# Patient Record
Sex: MALE | Race: ASIAN | NOT HISPANIC OR LATINO | URBAN - METROPOLITAN AREA
[De-identification: names, ages, dates, MRNs, and addresses within clinical notes are randomized per-mention and may not be internally consistent; named-entity substitution may affect disease eponyms.]

---

## 2017-06-05 ENCOUNTER — OUTPATIENT (OUTPATIENT)
Dept: EMERGENCY DEPT | Facility: HOSPITAL | Age: 47
LOS: 1 days | Discharge: HOME | End: 2017-06-05
Payer: MEDICAID

## 2017-06-05 VITALS
OXYGEN SATURATION: 99 % | SYSTOLIC BLOOD PRESSURE: 127 MMHG | RESPIRATION RATE: 18 BRPM | DIASTOLIC BLOOD PRESSURE: 94 MMHG | HEART RATE: 85 BPM | TEMPERATURE: 98 F

## 2017-06-05 VITALS
RESPIRATION RATE: 16 BRPM | HEART RATE: 61 BPM | SYSTOLIC BLOOD PRESSURE: 127 MMHG | DIASTOLIC BLOOD PRESSURE: 88 MMHG | OXYGEN SATURATION: 98 %

## 2017-06-05 DIAGNOSIS — I20.0 UNSTABLE ANGINA: ICD-10-CM

## 2017-06-05 DIAGNOSIS — Z29.9 ENCOUNTER FOR PROPHYLACTIC MEASURES, UNSPECIFIED: ICD-10-CM

## 2017-06-05 DIAGNOSIS — K21.9 GASTRO-ESOPHAGEAL REFLUX DISEASE WITHOUT ESOPHAGITIS: ICD-10-CM

## 2017-06-05 DIAGNOSIS — I10 ESSENTIAL (PRIMARY) HYPERTENSION: ICD-10-CM

## 2017-06-05 DIAGNOSIS — R07.9 CHEST PAIN, UNSPECIFIED: ICD-10-CM

## 2017-06-05 DIAGNOSIS — R74.0 NONSPECIFIC ELEVATION OF LEVELS OF TRANSAMINASE AND LACTIC ACID DEHYDROGENASE [LDH]: ICD-10-CM

## 2017-06-05 DIAGNOSIS — Z95.5 PRESENCE OF CORONARY ANGIOPLASTY IMPLANT AND GRAFT: Chronic | ICD-10-CM

## 2017-06-05 DIAGNOSIS — G89.29 OTHER CHRONIC PAIN: ICD-10-CM

## 2017-06-05 LAB
ALBUMIN SERPL ELPH-MCNC: 4.1 G/DL — SIGNIFICANT CHANGE UP (ref 3.3–5)
ALP SERPL-CCNC: 59 U/L — SIGNIFICANT CHANGE UP (ref 40–120)
ALT FLD-CCNC: 97 U/L — HIGH (ref 4–41)
AST SERPL-CCNC: 56 U/L — HIGH (ref 4–40)
BASOPHILS # BLD AUTO: 0.02 K/UL — SIGNIFICANT CHANGE UP (ref 0–0.2)
BASOPHILS NFR BLD AUTO: 0.3 % — SIGNIFICANT CHANGE UP (ref 0–2)
BILIRUB SERPL-MCNC: 0.7 MG/DL — SIGNIFICANT CHANGE UP (ref 0.2–1.2)
BUN SERPL-MCNC: 12 MG/DL — SIGNIFICANT CHANGE UP (ref 7–23)
CALCIUM SERPL-MCNC: 9.5 MG/DL — SIGNIFICANT CHANGE UP (ref 8.4–10.5)
CHLORIDE SERPL-SCNC: 102 MMOL/L — SIGNIFICANT CHANGE UP (ref 98–107)
CK MB BLD-MCNC: 1.64 NG/ML — SIGNIFICANT CHANGE UP (ref 1–6.6)
CK MB BLD-MCNC: 1.69 NG/ML — SIGNIFICANT CHANGE UP (ref 1–6.6)
CK MB BLD-MCNC: SIGNIFICANT CHANGE UP (ref 0–2.5)
CK MB BLD-MCNC: SIGNIFICANT CHANGE UP (ref 0–2.5)
CK SERPL-CCNC: 120 U/L — SIGNIFICANT CHANGE UP (ref 30–200)
CK SERPL-CCNC: 131 U/L — SIGNIFICANT CHANGE UP (ref 30–200)
CO2 SERPL-SCNC: 21 MMOL/L — LOW (ref 22–31)
CREAT SERPL-MCNC: 1.03 MG/DL — SIGNIFICANT CHANGE UP (ref 0.5–1.3)
EOSINOPHIL # BLD AUTO: 0.07 K/UL — SIGNIFICANT CHANGE UP (ref 0–0.5)
EOSINOPHIL NFR BLD AUTO: 1.1 % — SIGNIFICANT CHANGE UP (ref 0–6)
GLUCOSE SERPL-MCNC: 112 MG/DL — HIGH (ref 70–99)
HCT VFR BLD CALC: 44.8 % — SIGNIFICANT CHANGE UP (ref 39–50)
HGB BLD-MCNC: 14.8 G/DL — SIGNIFICANT CHANGE UP (ref 13–17)
IMM GRANULOCYTES NFR BLD AUTO: 0.3 % — SIGNIFICANT CHANGE UP (ref 0–1.5)
LYMPHOCYTES # BLD AUTO: 1.83 K/UL — SIGNIFICANT CHANGE UP (ref 1–3.3)
LYMPHOCYTES # BLD AUTO: 28.6 % — SIGNIFICANT CHANGE UP (ref 13–44)
MAGNESIUM SERPL-MCNC: 2.1 MG/DL — SIGNIFICANT CHANGE UP (ref 1.6–2.6)
MCHC RBC-ENTMCNC: 27.8 PG — SIGNIFICANT CHANGE UP (ref 27–34)
MCHC RBC-ENTMCNC: 33 % — SIGNIFICANT CHANGE UP (ref 32–36)
MCV RBC AUTO: 84.1 FL — SIGNIFICANT CHANGE UP (ref 80–100)
MONOCYTES # BLD AUTO: 0.44 K/UL — SIGNIFICANT CHANGE UP (ref 0–0.9)
MONOCYTES NFR BLD AUTO: 6.9 % — SIGNIFICANT CHANGE UP (ref 2–14)
NEUTROPHILS # BLD AUTO: 4.01 K/UL — SIGNIFICANT CHANGE UP (ref 1.8–7.4)
NEUTROPHILS NFR BLD AUTO: 62.8 % — SIGNIFICANT CHANGE UP (ref 43–77)
PLATELET # BLD AUTO: 194 K/UL — SIGNIFICANT CHANGE UP (ref 150–400)
PMV BLD: 12.6 FL — SIGNIFICANT CHANGE UP (ref 7–13)
POTASSIUM SERPL-MCNC: 4.4 MMOL/L — SIGNIFICANT CHANGE UP (ref 3.5–5.3)
POTASSIUM SERPL-SCNC: 4.4 MMOL/L — SIGNIFICANT CHANGE UP (ref 3.5–5.3)
PROT SERPL-MCNC: 6.7 G/DL — SIGNIFICANT CHANGE UP (ref 6–8.3)
RBC # BLD: 5.33 M/UL — SIGNIFICANT CHANGE UP (ref 4.2–5.8)
RBC # FLD: 13.3 % — SIGNIFICANT CHANGE UP (ref 10.3–14.5)
SODIUM SERPL-SCNC: 139 MMOL/L — SIGNIFICANT CHANGE UP (ref 135–145)
TROPONIN T SERPL-MCNC: < 0.06 NG/ML — SIGNIFICANT CHANGE UP (ref 0–0.06)
TROPONIN T SERPL-MCNC: < 0.06 NG/ML — SIGNIFICANT CHANGE UP (ref 0–0.06)
WBC # BLD: 6.39 K/UL — SIGNIFICANT CHANGE UP (ref 3.8–10.5)
WBC # FLD AUTO: 6.39 K/UL — SIGNIFICANT CHANGE UP (ref 3.8–10.5)

## 2017-06-05 PROCEDURE — 93010 ELECTROCARDIOGRAM REPORT: CPT | Mod: 59

## 2017-06-05 RX ORDER — MORPHINE SULFATE 50 MG/1
4 CAPSULE, EXTENDED RELEASE ORAL ONCE
Qty: 0 | Refills: 0 | Status: DISCONTINUED | OUTPATIENT
Start: 2017-06-05 | End: 2017-06-05

## 2017-06-05 RX ORDER — NITROGLYCERIN 6.5 MG
0.4 CAPSULE, EXTENDED RELEASE ORAL ONCE
Qty: 0 | Refills: 0 | Status: COMPLETED | OUTPATIENT
Start: 2017-06-05 | End: 2017-06-05

## 2017-06-05 RX ORDER — HEPARIN SODIUM 5000 [USP'U]/ML
4400 INJECTION INTRAVENOUS; SUBCUTANEOUS EVERY 6 HOURS
Qty: 0 | Refills: 0 | Status: DISCONTINUED | OUTPATIENT
Start: 2017-06-05 | End: 2017-06-05

## 2017-06-05 RX ORDER — PANTOPRAZOLE SODIUM 20 MG/1
40 TABLET, DELAYED RELEASE ORAL
Qty: 0 | Refills: 0 | Status: DISCONTINUED | OUTPATIENT
Start: 2017-06-05 | End: 2017-06-05

## 2017-06-05 RX ORDER — HEPARIN SODIUM 5000 [USP'U]/ML
INJECTION INTRAVENOUS; SUBCUTANEOUS
Qty: 25000 | Refills: 0 | Status: DISCONTINUED | OUTPATIENT
Start: 2017-06-05 | End: 2017-06-05

## 2017-06-05 RX ORDER — METOPROLOL TARTRATE 50 MG
50 TABLET ORAL DAILY
Qty: 0 | Refills: 0 | Status: DISCONTINUED | OUTPATIENT
Start: 2017-06-05 | End: 2017-06-05

## 2017-06-05 RX ORDER — ATORVASTATIN CALCIUM 80 MG/1
40 TABLET, FILM COATED ORAL AT BEDTIME
Qty: 0 | Refills: 0 | Status: DISCONTINUED | OUTPATIENT
Start: 2017-06-05 | End: 2017-06-05

## 2017-06-05 RX ORDER — OXYCODONE HYDROCHLORIDE 5 MG/1
10 TABLET ORAL EVERY 6 HOURS
Qty: 0 | Refills: 0 | Status: DISCONTINUED | OUTPATIENT
Start: 2017-06-05 | End: 2017-06-05

## 2017-06-05 RX ORDER — OXYCODONE HYDROCHLORIDE 5 MG/1
5 TABLET ORAL EVERY 6 HOURS
Qty: 0 | Refills: 0 | Status: DISCONTINUED | OUTPATIENT
Start: 2017-06-05 | End: 2017-06-05

## 2017-06-05 RX ORDER — SODIUM CHLORIDE 9 MG/ML
500 INJECTION INTRAMUSCULAR; INTRAVENOUS; SUBCUTANEOUS
Qty: 0 | Refills: 0 | Status: DISCONTINUED | OUTPATIENT
Start: 2017-06-05 | End: 2017-06-05

## 2017-06-05 RX ORDER — ASPIRIN/CALCIUM CARB/MAGNESIUM 324 MG
81 TABLET ORAL DAILY
Qty: 0 | Refills: 0 | Status: DISCONTINUED | OUTPATIENT
Start: 2017-06-05 | End: 2017-06-05

## 2017-06-05 RX ORDER — CLOPIDOGREL BISULFATE 75 MG/1
600 TABLET, FILM COATED ORAL ONCE
Qty: 0 | Refills: 0 | Status: COMPLETED | OUTPATIENT
Start: 2017-06-05 | End: 2017-06-05

## 2017-06-05 RX ORDER — GABAPENTIN 400 MG/1
600 CAPSULE ORAL THREE TIMES A DAY
Qty: 0 | Refills: 0 | Status: DISCONTINUED | OUTPATIENT
Start: 2017-06-05 | End: 2017-06-05

## 2017-06-05 RX ORDER — METOPROLOL TARTRATE 50 MG
25 TABLET ORAL DAILY
Qty: 0 | Refills: 0 | Status: DISCONTINUED | OUTPATIENT
Start: 2017-06-05 | End: 2017-06-05

## 2017-06-05 RX ORDER — ASPIRIN/CALCIUM CARB/MAGNESIUM 324 MG
162 TABLET ORAL ONCE
Qty: 0 | Refills: 0 | Status: COMPLETED | OUTPATIENT
Start: 2017-06-05 | End: 2017-06-05

## 2017-06-05 RX ORDER — ATORVASTATIN CALCIUM 80 MG/1
1 TABLET, FILM COATED ORAL
Qty: 30 | Refills: 0 | OUTPATIENT
Start: 2017-06-05 | End: 2017-07-05

## 2017-06-05 RX ORDER — ACETAMINOPHEN 500 MG
650 TABLET ORAL ONCE
Qty: 0 | Refills: 0 | Status: COMPLETED | OUTPATIENT
Start: 2017-06-05 | End: 2017-06-05

## 2017-06-05 RX ADMIN — MORPHINE SULFATE 4 MILLIGRAM(S): 50 CAPSULE, EXTENDED RELEASE ORAL at 13:40

## 2017-06-05 RX ADMIN — GABAPENTIN 600 MILLIGRAM(S): 400 CAPSULE ORAL at 15:38

## 2017-06-05 RX ADMIN — SODIUM CHLORIDE 75 MILLILITER(S): 9 INJECTION INTRAMUSCULAR; INTRAVENOUS; SUBCUTANEOUS at 17:56

## 2017-06-05 RX ADMIN — HEPARIN SODIUM 900 UNIT(S)/HR: 5000 INJECTION INTRAVENOUS; SUBCUTANEOUS at 15:38

## 2017-06-05 RX ADMIN — Medication 162 MILLIGRAM(S): at 09:38

## 2017-06-05 RX ADMIN — Medication 650 MILLIGRAM(S): at 19:18

## 2017-06-05 RX ADMIN — MORPHINE SULFATE 4 MILLIGRAM(S): 50 CAPSULE, EXTENDED RELEASE ORAL at 13:07

## 2017-06-05 RX ADMIN — Medication 0.4 MILLIGRAM(S): at 13:58

## 2017-06-05 RX ADMIN — CLOPIDOGREL BISULFATE 600 MILLIGRAM(S): 75 TABLET, FILM COATED ORAL at 15:38

## 2017-06-05 NOTE — DISCHARGE NOTE ADULT - CARE PROVIDER_API CALL
Chelsie Boudreaux (JOSLYN), Internal Medicine  [id number] [place] [address]  Phone: [phone]  Fax: [phone]

## 2017-06-05 NOTE — H&P ADULT - PROBLEM SELECTOR PLAN 1
tele monitor   cardiac enzymes x 3  Serial EKGs  DASH diet  continue ASA  House Cards following  increased metoprolol succinate to 50mg po daily. Order Nitro SL. Consider Imdur.  NST pharm if Hloli x3 neg

## 2017-06-05 NOTE — PATIENT PROFILE ADULT. - LANGUAGE ASSISTANCE NEEDED
Patient refusing phone at this time- speaks English/No-Patient/Caregiver offered and refused free interpretation services.

## 2017-06-05 NOTE — H&P ADULT - HISTORY OF PRESENT ILLNESS
45 yo male PMHx of CAD with cardiac stents x3 three years ago, HTN, GERD, and chronic Left shoulder and back pain p/w constant left sided chest pain at rest since this morning. Chest pain 10/10, described as pressure, non-pleuritic, non-radiating, accompanied by nausea and dizziness. Pt admits to fatigue and KRAMER x 3 months. Pt denies fever, chills, diaphoresis, palpitations, vomiting, or abdominal pain.     In E.D. pt received 2 baby aspirins and IV Morphine 4mg x1 with partial relief of chest pain.     On admission, chest pain 5/10, ordered nitroglycerin SL.

## 2017-06-05 NOTE — DISCHARGE NOTE ADULT - ADDITIONAL INSTRUCTIONS
1. Follow up with cardiologist within one week of discharge. Call for appointment. Return to ED for any concerning symptoms. Continue medications as prescribed. Low salt, low fat, low cholesterol diet.  2. No heavy lifting greater than 5-10 pounds with right wrist x one week. No strenuous activity x 3 weeks. Monitor site of procedure and notify your doctor for any redness, swelling, discharge.

## 2017-06-05 NOTE — DISCHARGE NOTE ADULT - HOSPITAL COURSE
47 y/o male with a PMHx of (reportedly) CAD S/P stent placement x 3, HTN, GERD, and chronic shoulder and back pain presents to ED with constant left sided chest pain. Upon arrival to ED, EKG revealed NSR with no ischemic changes. Pt ruled out with two sets of negative cardiac enzymes. CXR was clear. AST/ALT was slightly elevated. Pt was admitted to telemetry. Pt underwent cardiac cath on 6/5 which revealed normal coronary arteries with normal ejection fraction and no evidence of prior stenting. Pt had no events on telemetry. Case discussed with cards fellow on 6/5. Pt now medically stable for discharge home. No need for further workup. AST/ALT can be monitored as outpatient. No need for US abdomen. Pt now stable for discharge.

## 2017-06-05 NOTE — DISCHARGE NOTE ADULT - CARE PLAN
Principal Discharge DX:	Chest pain  Goal:	Prevent future episodes of chest pain. Ensure compliance with medications. Your cardiac cath revealed normal coronary arteries with no evidence of past or present blockages.  Instructions for follow-up, activity and diet:	Follow up with cardiologist within one week of discharge. Call for appointment. Return to ED for any concerning symptoms. Continue medications as prescribed. Low salt, low fat, low cholesterol diet.  Secondary Diagnosis:	HTN (hypertension)  Goal:	Maintain adequate control of your blood pressure. Goal BP < 130/80. Continue low sodium diet.  Instructions for follow-up, activity and diet:	Follow up with PCP and/or cardiologist for ongoing medical management of your hypertension. Continue medications as prescribed. Low salt diet.  Secondary Diagnosis:	GERD (gastroesophageal reflux disease)  Goal:	Continue your medications. Avoid fatty, greasy, spicy foods.  Instructions for follow-up, activity and diet:	Follow up with your PCP for slightly elevated liver enzymes.

## 2017-06-05 NOTE — DISCHARGE NOTE ADULT - PLAN OF CARE
Prevent future episodes of chest pain. Ensure compliance with medications. Your cardiac cath revealed normal coronary arteries with no evidence of past or present blockages. Follow up with cardiologist within one week of discharge. Call for appointment. Return to ED for any concerning symptoms. Continue medications as prescribed. Low salt, low fat, low cholesterol diet. Maintain adequate control of your blood pressure. Goal BP < 130/80. Continue low sodium diet. Follow up with PCP and/or cardiologist for ongoing medical management of your hypertension. Continue medications as prescribed. Low salt diet. Continue your medications. Avoid fatty, greasy, spicy foods. Follow up with your PCP for slightly elevated liver enzymes.

## 2017-06-05 NOTE — ED SUB INTERN NOTE - OBJECTIVE STATEMENT FT
Radha PA-S = 47yo Lithuanian speaking male, PMHx htn, 3 stents placed 3y ago at [facility], presents c/o upper L sided chest "pressure", sob and chills since yesterday. Pt states that the pain began yesterday morning but led to sob which prevented him from running/walking/working, which led him to the ED for assessment. Pt states that he took 1 ASA (unk dose) this AM and 2 just before arriving at the ED with no relief of chest pain. Pt rates the chest pain a 10/10 and describes the pain as a "pressure." There is no radiation of the "pressure." Pt admits to having sob sxs for the past 3 years, but yesterday was the worst he has ever felt it. PMHx of htn is controlled by metoprolol and ASA as per pt. Pt does not recall his last stress test and does not follow a cardiologist. Pt denies palpitations, diaphoresis, tobacco/EtOH use, other surgical hx, sig family history.    Lungs - CTAB, no w/r/r  Cardio - clear S1 S2, RRR, no m/g/r appreciated at this time

## 2017-06-05 NOTE — CHART NOTE - NSCHARTNOTEFT_GEN_A_CORE
Called by RN to see patient for chest pain. CP 4/10 left sided   NO SOB, KRAMER, PND, orthopnea, palpitations, diaphoresis, lightheadedness, dizziness, syncope, increased lowr extremity edema, fever chills, malaise, myalgias, anorexia, weight changes ( loss or gain), night sweats, generalized fatigue abdominal pain, N/V/C/D BRBPR, melena, urinary symptoms, cough, and wheezing.      Physical Exam  Vital Signs Last 24 Hrs  T(C): 36.9, Max: 36.9 (06-05 @ 14:22)  T(F): 98.4, Max: 98.4 (06-05 @ 14:22)  HR: 72 (64 - 85)  BP: 118/83 (118/83 - 133/83)  BP(mean): --  RR: 16 (16 - 18)  SpO2: 100% (98% - 100%)  Appearance: Normal	  HEENT:   Normal oral mucosa, PERRL, EOMI	  Lymphatic: No lymphadenopathy  Cardiovascular: Normal S1 S2, No JVD, No murmurs, No edema  Respiratory: Lungs clear to auscultation	  Psychiatry: A & O x 3, Mood & affect appropriate  Gastrointestinal:  Soft, Non-tender, + BS	  Skin: No rashes, No ecchymoses, No cyanosis  Neurologic: Non-focal  Extremities: Normal range of motion, No clubbing, cyanosis or edema  Vascular: Peripheral pulses palpable 2+ bilaterally    TELEMETRY: Rate regular @ 77 bpm no arrythmias 	    ECG: SR @ 54 bpm 	  RADIOLOGY: CXR:     OTHER: 	  LABS                        14.8 [13.0 - 17.0]  6.39 [3.8 - 10.5] )-----------( 194 [150 - 400]    ( 05 Jun 2017 09:22 )             44.8 [39.0 - 50.0]    06-05    139  |  102  |  12  ----------------------------<  112<H>  4.4   |  21<L>  |  1.03    Ca    9.5      05 Jun 2017 09:22  Mg     2.1     06-05    TPro  6.7  /  Alb  4.1  /  TBili  0.7  /  DBili  x   /  AST  56<H>  /  ALT  97<H>  /  AlkPhos  59  06-05    CARDIAC MARKERS ( 05 Jun 2017 09:22 )  x     / < 0.06 ng/mL / 131 u/L / 1.69 ng/mL / x        LIVER FUNCTIONS - ( 05 Jun 2017 09:22 )  Alb: 4.1 g/dL / Pro: 6.7 g/dL / ALK PHOS: 59 u/L / ALT: 97 u/L / AST: 56 u/L / GGT: x           CAPILLARY BLOOD GLUCOSE    I&O's Detail    Daily Height in cm: 172.72 (05 Jun 2017 13:58)    Daily   Allergies    No Known Allergies    Intolerances      MEDICATIONS  (STANDING):  aspirin enteric coated 81milliGRAM(s) Oral daily  gabapentin 600milliGRAM(s) Oral three times a day  pantoprazole    Tablet 40milliGRAM(s) Oral before breakfast  metoprolol succinate ER 50milliGRAM(s) Oral daily  atorvastatin 40milliGRAM(s) Oral at bedtime    MEDICATIONS  (PRN):  oxyCODONE IR 5milliGRAM(s) Oral every 6 hours PRN mild and Moderate Pain (4 - 6)  oxyCODONE IR 10milliGRAM(s) Oral every 6 hours PRN Severe Pain (7 - 10)      47 yo M unstable angina with chest pain  1) CAD- Continue aspirin, metoprolol, statin. O2, morphine. NTG PRN for chest pain. Consider heparin/cath today  Will discuss with Dr Shi Called by RN to see patient for chest pain. CP 4/10 left sided   NO SOB, KRAMER, PND, orthopnea, palpitations, diaphoresis, lightheadedness, dizziness, syncope, increased lowr extremity edema, fever chills, malaise, myalgias, anorexia, weight changes ( loss or gain), night sweats, generalized fatigue abdominal pain, N/V/C/D BRBPR, melena, urinary symptoms, cough, and wheezing.      Physical Exam  Vital Signs Last 24 Hrs  T(C): 36.9, Max: 36.9 (06-05 @ 14:22)  T(F): 98.4, Max: 98.4 (06-05 @ 14:22)  HR: 72 (64 - 85)  BP: 118/83 (118/83 - 133/83)  BP(mean): --  RR: 16 (16 - 18)  SpO2: 100% (98% - 100%)  Appearance: Normal	  HEENT:   Normal oral mucosa, PERRL, EOMI	  Lymphatic: No lymphadenopathy  Cardiovascular: Normal S1 S2, No JVD, No murmurs, No edema  Respiratory: Lungs clear to auscultation	  Psychiatry: A & O x 3, Mood & affect appropriate  Gastrointestinal:  Soft, Non-tender, + BS	  Skin: No rashes, No ecchymoses, No cyanosis  Neurologic: Non-focal  Extremities: Normal range of motion, No clubbing, cyanosis or edema  Vascular: Peripheral pulses palpable 2+ bilaterally    TELEMETRY: Rate regular @ 77 bpm no arrythmias 	    ECG: SR @ 54 bpm 	  RADIOLOGY: CXR:     OTHER: 	  LABS                        14.8 [13.0 - 17.0]  6.39 [3.8 - 10.5] )-----------( 194 [150 - 400]    ( 05 Jun 2017 09:22 )             44.8 [39.0 - 50.0]    06-05    139  |  102  |  12  ----------------------------<  112<H>  4.4   |  21<L>  |  1.03    Ca    9.5      05 Jun 2017 09:22  Mg     2.1     06-05    TPro  6.7  /  Alb  4.1  /  TBili  0.7  /  DBili  x   /  AST  56<H>  /  ALT  97<H>  /  AlkPhos  59  06-05    CARDIAC MARKERS ( 05 Jun 2017 09:22 )  x     / < 0.06 ng/mL / 131 u/L / 1.69 ng/mL / x        LIVER FUNCTIONS - ( 05 Jun 2017 09:22 )  Alb: 4.1 g/dL / Pro: 6.7 g/dL / ALK PHOS: 59 u/L / ALT: 97 u/L / AST: 56 u/L / GGT: x           CAPILLARY BLOOD GLUCOSE    I&O's Detail    Daily Height in cm: 172.72 (05 Jun 2017 13:58)    Daily   Allergies    No Known Allergies    Intolerances      MEDICATIONS  (STANDING):  aspirin enteric coated 81milliGRAM(s) Oral daily  gabapentin 600milliGRAM(s) Oral three times a day  pantoprazole    Tablet 40milliGRAM(s) Oral before breakfast  metoprolol succinate ER 50milliGRAM(s) Oral daily  atorvastatin 40milliGRAM(s) Oral at bedtime    MEDICATIONS  (PRN):  oxyCODONE IR 5milliGRAM(s) Oral every 6 hours PRN mild and Moderate Pain (4 - 6)  oxyCODONE IR 10milliGRAM(s) Oral every 6 hours PRN Severe Pain (7 - 10)      45 yo M unstable angina with chest pain  1) CAD- Continue aspirin, metoprolol, statin. O2, morphine. NTG PRN for chest pain. Consider heparin/cath today  Discuss with Dr Shi and Cardio fellow

## 2017-06-05 NOTE — DISCHARGE NOTE ADULT - MEDICATION SUMMARY - MEDICATIONS TO TAKE
I will START or STAY ON the medications listed below when I get home from the hospital:    aspirin 81 mg oral tablet  -- 1 tab(s) by mouth once a day  -- Indication: For CAD (coronary artery disease)    Percocet 5/325 325 mg-5 mg oral tablet  -- 1 tab(s) by mouth every 6 hours MDD:3  -- Caution federal law prohibits the transfer of this drug to any person other  than the person for whom it was prescribed.  May cause drowsiness.  Alcohol may intensify this effect.  Use care when operating dangerous machinery.  This prescription cannot be refilled.  This product contains acetaminophen.  Do not use  with any other product containing acetaminophen to prevent possible liver damage.  Using more of this medication than prescribed may cause serious breathing problems.    -- Indication: For Pain    ibuprofen 200 mg oral tablet  -- 3 tab(s) by mouth every 6 hours, As Needed  -- Indication: For Pain    gabapentin 600 mg oral tablet  -- 1 tab(s) by mouth 3 times a day  -- Indication: For Pain    atorvastatin 40 mg oral tablet  -- 1 tab(s) by mouth once a day (at bedtime)  -- Indication: For HLD (hyperlipidemia)    metoprolol succinate 25 mg oral tablet, extended release  -- 1 tab(s) by mouth once a day  -- Indication: For HTN (hypertension)    omeprazole 40 mg oral delayed release capsule  -- 1 cap(s) by mouth once a day  -- Indication: For GERD (gastroesophageal reflux disease)

## 2017-06-05 NOTE — DISCHARGE NOTE ADULT - PATIENT PORTAL LINK FT
“You can access the [facility] Patient Portal, offered by [facility], by registering with the following website: [email or web address]”

## 2017-06-05 NOTE — ED ADULT NURSE NOTE - OBJECTIVE STATEMENT
Pt received to room 3. Pt complaining of chest pain that started last night, subsided on its own but started again this morning. Pt states he had similar pain 3 years ago and had a cath with 3 stents. Pt placed on cardiac monitor. IV access obtained, labs drawn and sent. Will continue to monitor.

## 2017-06-05 NOTE — H&P ADULT - ASSESSMENT
45 yo male PMHx of CAD with cardiac stents x3 three years ago, HTN, GERD, and chronic shoulder and back pain p/w constant left sided chest pain at rest since this morning, admitted to tele for Unstable Angina.

## 2017-06-05 NOTE — DISCHARGE NOTE ADULT - NS AS ACTIVITY OBS
As tolerated/No Heavy lifting/straining/Showering allowed/Walking-Outdoors allowed/Do not drive or operate machinery/Walking-Indoors allowed

## 2017-06-05 NOTE — CONSULT NOTE ADULT - SUBJECTIVE AND OBJECTIVE BOX
CHIEF COMPLAINT: 10/10 chest pain    HISTORY OF PRESENT ILLNESS:      Allergies    No Known Allergies    Intolerances    	    MEDICATIONS:                  PAST MEDICAL & SURGICAL HISTORY:  GERD (gastroesophageal reflux disease)  HTN (hypertension)  Sciatic Nerve Disease  S/P transurethral resection of prostate: 2011      FAMILY HISTORY:  Family history of diabetes mellitus (Mother)  Family history of hypertension (Mother)  Family history of colon cancer (Father)  No pertinent family history in first degree relatives      SOCIAL HISTORY:    [ ] Non-smoker  [ ] Smoker  [ ] Alcohol      REVIEW OF SYSTEMS:  See HPI. Otherwise, 10 point ROS done and otherwise negative.    PHYSICAL EXAM:  T(C): 36.7, Max: 36.7 (06-05 @ 09:02)  HR: 64 (64 - 85)  BP: 133/83 (127/94 - 133/83)  RR: 16 (16 - 18)  SpO2: 99% (99% - 99%)  Wt(kg): --  I&O's Summary      Appearance: Normal	  HEENT:   Normal oral mucosa, PERRL, EOMI	  Lymphatic: No lymphadenopathy  Cardiovascular: Normal S1 S2, No JVD, No murmurs, No edema  Respiratory: Lungs clear to auscultation	  Psychiatry: A & O x 3, Mood & affect appropriate  Gastrointestinal:  Soft, Non-tender, + BS	  Skin: No rashes, No ecchymoses, No cyanosis	  Neurologic: Non-focal  Extremities: Normal range of motion, No clubbing, cyanosis or edema  Vascular: Peripheral pulses palpable 2+ bilaterally        LABS:	 	    CBC Full  -  ( 05 Jun 2017 09:22 )  WBC Count : 6.39 K/uL  Hemoglobin : 14.8 g/dL  Hematocrit : 44.8 %  Platelet Count - Automated : 194 K/uL  Mean Cell Volume : 84.1 fL  Mean Cell Hemoglobin : 27.8 pg  Mean Cell Hemoglobin Concentration : 33.0 %  Auto Neutrophil # : 4.01 K/uL  Auto Lymphocyte # : 1.83 K/uL  Auto Monocyte # : 0.44 K/uL  Auto Eosinophil # : 0.07 K/uL  Auto Basophil # : 0.02 K/uL  Auto Neutrophil % : 62.8 %  Auto Lymphocyte % : 28.6 %  Auto Monocyte % : 6.9 %  Auto Eosinophil % : 1.1 %  Auto Basophil % : 0.3 %            proBNP:   Lipid Profile:   HgA1c:   TSH:       CARDIAC MARKERS:            TELEMETRY: 	    ECG:  	  RADIOLOGY:  OTHER: 	    PREVIOUS DIAGNOSTIC TESTING:    [ ] Echocardiogram:  2013  CONCLUSIONS:  1. Normal left ventricular systolic function. No segmental  wall motion abnormalities.  2. Normal left ventricular diastolic function.  3. Normal right ventricular size and function.      Pharm nuc Stress Test: 9/2016    Normal Study  * Myocardial Perfusion SPECT results are normal.  * Normal myocardial perfusion scan,with no evidence of  infarction or inducible ischemia. There was a very mild  basal inferior defect that corrected with prone imaging  for attenuation correction.  * Post-stress gated wall motion analysis was performed  (LVEF = 63 %;LVEDV = 76 ml.), revealing normal LV  function. RV function appeared normal.  	  	  ASSESSMENT/PLAN: CHIEF COMPLAINT: 10/10 chest pain    HISTORY OF PRESENT ILLNESS: 46M pmhx of CAD with preserved EF (per patient 3 ANDREW at [facility] 2014), does not follow with cardiologist, and HTN presents with chest pain. States started yesterday, described as chest pressure, left sided, no radiation, worse with exertion, with some associated dyspnea. Notes some decreased exercise tolerance within the last 2 days. States the last time he presented for ANDREW at [facility], had a "Stronger" version of this pain. Came in todaybecause    pain not resolving.    In ED, HR in the 60s, bp normotensive. Notably no cardiologist, only meds include aspirin, and metoprolol succinate 25mg given by PCP. Last stress in our record 2016 normal perfusion.         Allergies    No Known Allergies    Intolerances    	    MEDICATIONS:  asa 81  metop succ 25 qd                PAST MEDICAL & SURGICAL HISTORY:  GERD (gastroesophageal reflux disease)  HTN (hypertension)  Sciatic Nerve Disease  S/P transurethral resection of prostate: 2011      FAMILY HISTORY:  Family history of diabetes mellitus (Mother)  Family history of hypertension (Mother)  Family history of colon cancer (Father)  No pertinent family history in first degree relatives      SOCIAL HISTORY:    non smoker, no etoh, works as .       REVIEW OF SYSTEMS:  See HPI. Otherwise, 10 point ROS done and otherwise negative.    PHYSICAL EXAM:  T(C): 36.7, Max: 36.7 (06-05 @ 09:02)  HR: 64 (64 - 85)  BP: 133/83 (127/94 - 133/83)  RR: 16 (16 - 18)  SpO2: 99% (99% - 99%)  Wt(kg): --  I&O's Summary      Appearance: Normal	comfortable, laying flat, talking on cellphone   HEENT:   Normal oral mucosa, PERRL, EOMI	  Lymphatic: No lymphadenopathy  Cardiovascular: Normal S1 S2, No JVD, No murmurs, No edema  Respiratory: Lungs clear to auscultation	  Psychiatry: A & O x 3, Mood & affect appropriate  Gastrointestinal:  Soft, Non-tender, + BS	  Skin: No rashes, No ecchymoses, No cyanosis	  Neurologic: Non-focal  Extremities: Normal range of motion, No clubbing, cyanosis or edema  Vascular: Peripheral pulses palpable 2+ bilaterally        LABS:	 	    CBC Full  -  ( 05 Jun 2017 09:22 )  WBC Count : 6.39 K/uL  Hemoglobin : 14.8 g/dL  Hematocrit : 44.8 %  Platelet Count - Automated : 194 K/uL  Mean Cell Volume : 84.1 fL  Mean Cell Hemoglobin : 27.8 pg  Mean Cell Hemoglobin Concentration : 33.0 %  Auto Neutrophil # : 4.01 K/uL  Auto Lymphocyte # : 1.83 K/uL  Auto Monocyte # : 0.44 K/uL  Auto Eosinophil # : 0.07 K/uL  Auto Basophil # : 0.02 K/uL  Auto Neutrophil % : 62.8 %  Auto Lymphocyte % : 28.6 %  Auto Monocyte % : 6.9 %  Auto Eosinophil % : 1.1 %  Auto Basophil % : 0.3 %        CARDIAC MARKERS:      ckmb 1.69  trop 0.06        TELEMETRY: 	sinus in 60s    ECG:  	NSR< TWi III and avF (old) no other ischemic changes  RADIOLOGY: CXR Pa lat clear      PREVIOUS DIAGNOSTIC TESTING:    [ ] Echocardiogram:  2013  CONCLUSIONS:  1. Normal left ventricular systolic function. No segmental  wall motion abnormalities.  2. Normal left ventricular diastolic function.  3. Normal right ventricular size and function.      Pharm nuc Stress Test: 9/2016    Normal Study  * Myocardial Perfusion SPECT results are normal.  * Normal myocardial perfusion scan,with no evidence of  infarction or inducible ischemia. There was a very mild  basal inferior defect that corrected with prone imaging  for attenuation correction.  * Post-stress gated wall motion analysis was performed  (LVEF = 63 %;LVEDV = 76 ml.), revealing normal LV  function. RV function appeared normal.  	  	  ASSESSMENT/PLAN: 	  46M pmhx of CAD with prior ANDREW x3 at [facility] around 2014 and HTN presents with chest pressure x 2 days.    # Chest pressure: typical symptoms but appears very comfortable one exam, not tachycardic. EKG without ischemic changes. high risk patient but low suspicion of ACS at this time. Furthermore, not on optimal med mgmt. Will admit to cardiology primary service, evaluate CE x 3 and likely stress testing.  - check a1c, lipid panel, CE q6-8h x 3  - start atorvastatin 40mg, increase to metoprolol succinate 50mg qd for anti anginal. Can consider further anti anginal uptitration including insordil  - cont aspirin 81. Likely a patient to consider DAPT for a longer period.  - IF troponins negative x 3, exercice nuclear stress test     Lillian Rausch MD  [id number]

## 2017-06-05 NOTE — PROVIDER CONTACT NOTE (OTHER) - SITUATION
PA made aware patient arrived to [address] c/o 5/10 chest pressure on left side of chest, reproducible and patient states hurts worse with exertion and inspiration, non radiating.

## 2017-06-05 NOTE — ED ADULT TRIAGE NOTE - CHIEF COMPLAINT QUOTE
p/t c/o of midsternal chest pain since last night with mild sob p/t appears uncomfortable p/t  s/p cardiac cath with stents 3 years ago similar pain

## 2017-06-05 NOTE — ED PROVIDER NOTE - MEDICAL DECISION MAKING DETAILS
46 year old male with a PMHx of stents x3 3 years ago, HTN pw left sided chest pressure and worsening KRAMER since yesterday morning.  Plan: cbc, cmp, CEx2, EKG, CXR

## 2017-06-05 NOTE — H&P ADULT - RS GEN PE MLT RESP DETAILS PC
no chest wall tenderness/clear to auscultation bilaterally/airway patent/good air movement/respirations non-labored/breath sounds equal

## 2017-06-05 NOTE — H&P ADULT - PMH
CAD (coronary artery disease)    GERD (gastroesophageal reflux disease)    HTN (hypertension)    Sciatic Nerve Disease

## 2017-06-05 NOTE — ED PROVIDER NOTE - OBJECTIVE STATEMENT
46 year old male with a PMHx of stents x3 3 years ago, HTN pw left sided chest pressure and worsening KRAMER since yesterday morning. Pain is pressure in nature, 10/10 in severity, took 3 tabs of ASA this morning with no relief and associated with KRAMER. Admits to intermittent nausea. Last stress October 2016. Denies syncope, f/c, vomiting, abdominal pain, hx of clots/PE/DVT, cough.

## 2018-05-02 ENCOUNTER — INPATIENT (INPATIENT)
Facility: HOSPITAL | Age: 48
LOS: 2 days | Discharge: HOME | End: 2018-05-05
Attending: INTERNAL MEDICINE | Admitting: INTERNAL MEDICINE
Payer: MEDICAID

## 2018-05-02 VITALS
SYSTOLIC BLOOD PRESSURE: 140 MMHG | TEMPERATURE: 98 F | DIASTOLIC BLOOD PRESSURE: 82 MMHG | OXYGEN SATURATION: 98 % | RESPIRATION RATE: 16 BRPM | HEART RATE: 69 BPM

## 2018-05-02 DIAGNOSIS — K52.9 NONINFECTIVE GASTROENTERITIS AND COLITIS, UNSPECIFIED: ICD-10-CM

## 2018-05-02 DIAGNOSIS — Z95.5 PRESENCE OF CORONARY ANGIOPLASTY IMPLANT AND GRAFT: Chronic | ICD-10-CM

## 2018-05-02 LAB
ALBUMIN SERPL ELPH-MCNC: 4.4 G/DL — SIGNIFICANT CHANGE UP (ref 3.3–5)
ALP SERPL-CCNC: 62 U/L — SIGNIFICANT CHANGE UP (ref 40–120)
ALT FLD-CCNC: 16 U/L — SIGNIFICANT CHANGE UP (ref 4–41)
APTT BLD: 27.7 SEC — SIGNIFICANT CHANGE UP (ref 27.5–37.4)
AST SERPL-CCNC: 15 U/L — SIGNIFICANT CHANGE UP (ref 4–40)
BASE EXCESS BLDV CALC-SCNC: 4.7 MMOL/L — SIGNIFICANT CHANGE UP
BASOPHILS # BLD AUTO: 0.04 K/UL — SIGNIFICANT CHANGE UP (ref 0–0.2)
BASOPHILS NFR BLD AUTO: 0.4 % — SIGNIFICANT CHANGE UP (ref 0–2)
BILIRUB SERPL-MCNC: 0.7 MG/DL — SIGNIFICANT CHANGE UP (ref 0.2–1.2)
BLD GP AB SCN SERPL QL: NEGATIVE — SIGNIFICANT CHANGE UP
BLOOD GAS VENOUS - CREATININE: 0.97 MG/DL — SIGNIFICANT CHANGE UP (ref 0.5–1.3)
BUN SERPL-MCNC: 9 MG/DL — SIGNIFICANT CHANGE UP (ref 7–23)
CALCIUM SERPL-MCNC: 9.7 MG/DL — SIGNIFICANT CHANGE UP (ref 8.4–10.5)
CHLORIDE BLDV-SCNC: 109 MMOL/L — HIGH (ref 96–108)
CHLORIDE SERPL-SCNC: 101 MMOL/L — SIGNIFICANT CHANGE UP (ref 98–107)
CO2 SERPL-SCNC: 26 MMOL/L — SIGNIFICANT CHANGE UP (ref 22–31)
CREAT SERPL-MCNC: 1.11 MG/DL — SIGNIFICANT CHANGE UP (ref 0.5–1.3)
EOSINOPHIL # BLD AUTO: 0.02 K/UL — SIGNIFICANT CHANGE UP (ref 0–0.5)
EOSINOPHIL NFR BLD AUTO: 0.2 % — SIGNIFICANT CHANGE UP (ref 0–6)
GAS PNL BLDV: 134 MMOL/L — LOW (ref 136–146)
GLUCOSE BLDV-MCNC: 93 — SIGNIFICANT CHANGE UP (ref 70–99)
GLUCOSE SERPL-MCNC: 100 MG/DL — HIGH (ref 70–99)
HCO3 BLDV-SCNC: 26 MMOL/L — SIGNIFICANT CHANGE UP (ref 20–27)
HCT VFR BLD CALC: 44.2 % — SIGNIFICANT CHANGE UP (ref 39–50)
HCT VFR BLD CALC: 47.9 % — SIGNIFICANT CHANGE UP (ref 39–50)
HCT VFR BLDV CALC: 49.6 % — SIGNIFICANT CHANGE UP (ref 39–51)
HGB BLD-MCNC: 14.4 G/DL — SIGNIFICANT CHANGE UP (ref 13–17)
HGB BLD-MCNC: 15.5 G/DL — SIGNIFICANT CHANGE UP (ref 13–17)
HGB BLDV-MCNC: 16.2 G/DL — SIGNIFICANT CHANGE UP (ref 13–17)
IMM GRANULOCYTES # BLD AUTO: 0.03 # — SIGNIFICANT CHANGE UP
IMM GRANULOCYTES NFR BLD AUTO: 0.3 % — SIGNIFICANT CHANGE UP (ref 0–1.5)
INR BLD: 0.98 — SIGNIFICANT CHANGE UP (ref 0.88–1.17)
LACTATE BLDV-MCNC: 1.1 MMOL/L — SIGNIFICANT CHANGE UP (ref 0.5–2)
LIDOCAIN IGE QN: 36.1 U/L — SIGNIFICANT CHANGE UP (ref 7–60)
LYMPHOCYTES # BLD AUTO: 1.44 K/UL — SIGNIFICANT CHANGE UP (ref 1–3.3)
LYMPHOCYTES # BLD AUTO: 13.4 % — SIGNIFICANT CHANGE UP (ref 13–44)
MCHC RBC-ENTMCNC: 27.3 PG — SIGNIFICANT CHANGE UP (ref 27–34)
MCHC RBC-ENTMCNC: 27.3 PG — SIGNIFICANT CHANGE UP (ref 27–34)
MCHC RBC-ENTMCNC: 32.4 % — SIGNIFICANT CHANGE UP (ref 32–36)
MCHC RBC-ENTMCNC: 32.6 % — SIGNIFICANT CHANGE UP (ref 32–36)
MCV RBC AUTO: 83.9 FL — SIGNIFICANT CHANGE UP (ref 80–100)
MCV RBC AUTO: 84.3 FL — SIGNIFICANT CHANGE UP (ref 80–100)
MONOCYTES # BLD AUTO: 0.61 K/UL — SIGNIFICANT CHANGE UP (ref 0–0.9)
MONOCYTES NFR BLD AUTO: 5.7 % — SIGNIFICANT CHANGE UP (ref 2–14)
NEUTROPHILS # BLD AUTO: 8.62 K/UL — HIGH (ref 1.8–7.4)
NEUTROPHILS NFR BLD AUTO: 80 % — HIGH (ref 43–77)
NRBC # FLD: 0 — SIGNIFICANT CHANGE UP
NRBC # FLD: 0 — SIGNIFICANT CHANGE UP
OB PNL STL: POSITIVE — SIGNIFICANT CHANGE UP
PCO2 BLDV: 55 MMHG — HIGH (ref 41–51)
PH BLDV: 7.36 PH — SIGNIFICANT CHANGE UP (ref 7.32–7.43)
PLATELET # BLD AUTO: 208 K/UL — SIGNIFICANT CHANGE UP (ref 150–400)
PLATELET # BLD AUTO: 223 K/UL — SIGNIFICANT CHANGE UP (ref 150–400)
PMV BLD: 11.4 FL — SIGNIFICANT CHANGE UP (ref 7–13)
PMV BLD: 11.7 FL — SIGNIFICANT CHANGE UP (ref 7–13)
PO2 BLDV: 29 MMHG — LOW (ref 35–40)
POTASSIUM BLDV-SCNC: 4 MMOL/L — SIGNIFICANT CHANGE UP (ref 3.4–4.5)
POTASSIUM SERPL-MCNC: 4.3 MMOL/L — SIGNIFICANT CHANGE UP (ref 3.5–5.3)
POTASSIUM SERPL-SCNC: 4.3 MMOL/L — SIGNIFICANT CHANGE UP (ref 3.5–5.3)
PROT SERPL-MCNC: 7.2 G/DL — SIGNIFICANT CHANGE UP (ref 6–8.3)
PROTHROM AB SERPL-ACNC: 11.3 SEC — SIGNIFICANT CHANGE UP (ref 9.8–13.1)
RBC # BLD: 5.27 M/UL — SIGNIFICANT CHANGE UP (ref 4.2–5.8)
RBC # BLD: 5.68 M/UL — SIGNIFICANT CHANGE UP (ref 4.2–5.8)
RBC # FLD: 12.8 % — SIGNIFICANT CHANGE UP (ref 10.3–14.5)
RBC # FLD: 12.8 % — SIGNIFICANT CHANGE UP (ref 10.3–14.5)
RH IG SCN BLD-IMP: POSITIVE — SIGNIFICANT CHANGE UP
SAO2 % BLDV: 51.1 % — LOW (ref 60–85)
SODIUM SERPL-SCNC: 140 MMOL/L — SIGNIFICANT CHANGE UP (ref 135–145)
WBC # BLD: 10.76 K/UL — HIGH (ref 3.8–10.5)
WBC # BLD: 11 K/UL — HIGH (ref 3.8–10.5)
WBC # FLD AUTO: 10.76 K/UL — HIGH (ref 3.8–10.5)
WBC # FLD AUTO: 11 K/UL — HIGH (ref 3.8–10.5)

## 2018-05-02 PROCEDURE — 74177 CT ABD & PELVIS W/CONTRAST: CPT | Mod: 26

## 2018-05-02 RX ORDER — METRONIDAZOLE 500 MG
500 TABLET ORAL ONCE
Qty: 0 | Refills: 0 | Status: COMPLETED | OUTPATIENT
Start: 2018-05-02 | End: 2018-05-02

## 2018-05-02 RX ORDER — MORPHINE SULFATE 50 MG/1
4 CAPSULE, EXTENDED RELEASE ORAL ONCE
Qty: 0 | Refills: 0 | Status: DISCONTINUED | OUTPATIENT
Start: 2018-05-02 | End: 2018-05-02

## 2018-05-02 RX ORDER — SODIUM CHLORIDE 9 MG/ML
1000 INJECTION INTRAMUSCULAR; INTRAVENOUS; SUBCUTANEOUS ONCE
Qty: 0 | Refills: 0 | Status: COMPLETED | OUTPATIENT
Start: 2018-05-02 | End: 2018-05-02

## 2018-05-02 RX ORDER — IBUPROFEN 200 MG
3 TABLET ORAL
Qty: 0 | Refills: 0 | COMMUNITY

## 2018-05-02 RX ORDER — CIPROFLOXACIN LACTATE 400MG/40ML
400 VIAL (ML) INTRAVENOUS ONCE
Qty: 0 | Refills: 0 | Status: COMPLETED | OUTPATIENT
Start: 2018-05-02 | End: 2018-05-02

## 2018-05-02 RX ADMIN — SODIUM CHLORIDE 1000 MILLILITER(S): 9 INJECTION INTRAMUSCULAR; INTRAVENOUS; SUBCUTANEOUS at 17:39

## 2018-05-02 RX ADMIN — Medication 200 MILLIGRAM(S): at 21:56

## 2018-05-02 RX ADMIN — Medication 100 MILLIGRAM(S): at 20:45

## 2018-05-02 NOTE — ED ADULT NURSE REASSESSMENT NOTE - NS ED NURSE REASSESS COMMENT FT1
rec'd pt. a&ox3, appears in NAD, breathes with ease, with g20 saline lock on rt ac. denies any pain  at this time. denies any n/v, no dizziness/weakness. Flagyl started as ordered. as per MD Feng, no blood cultures needed to be drawn. pt. to be admitted. will continue to monitor

## 2018-05-02 NOTE — ED PROVIDER NOTE - ATTENDING CONTRIBUTION TO CARE
Dr. Traylor:  I have personally performed a face to face bedside history and physical examination of this patient. I have discussed the history, examination, review of systems, assessment and plan of management with the resident. I have reviewed the electronic medical record and amended it to reflect my history, review of systems, physical exam, assessment and plan.    47M h/o HTN and on daily 81mg aspirin present with BRBPR x 12 today.  +diffuse abdominal pain, endorses weakness and fatigue.  Denies fever/chills, cp, sob, n/v, h/o GIB    Exam:  - nad  - rrr  - ctab  - abd soft, diffuse TTP  - rectal as per resident, no hemorrhoids, +red blood    A/P  - BRBPR  - cbc, cmp, coags, type and screen  - CT abd/pelvis

## 2018-05-02 NOTE — ED PROVIDER NOTE - MEDICAL DECISION MAKING DETAILS
47M presenting with abd pain and BRBPR - will r/o anemia vs. diverticulitis vs. colitis vs. perf. Will get basics, t/s, coags, CT

## 2018-05-02 NOTE — H&P ADULT - NSHPLABSRESULTS_GEN_ALL_CORE
.  LABS:                         14.4   11.00 )-----------( 208      ( 02 May 2018 19:01 )             44.2     05-02    140  |  101  |  9   ----------------------------<  100<H>  4.3   |  26  |  1.11    Ca    9.7      02 May 2018 17:33    TPro  7.2  /  Alb  4.4  /  TBili  0.7  /  DBili  x   /  AST  15  /  ALT  16  /  AlkPhos  62  05-02    PT/INR - ( 02 May 2018 17:33 )   PT: 11.3 SEC;   INR: 0.98          PTT - ( 02 May 2018 17:33 )  PTT:27.7 SEC .  LABS:                         14.4   11.00 )-----------( 208      ( 02 May 2018 19:01 )             44.2     05-02    140  |  101  |  9   ----------------------------<  100<H>  4.3   |  26  |  1.11    Ca    9.7      02 May 2018 17:33    TPro  7.2  /  Alb  4.4  /  TBili  0.7  /  DBili  x   /  AST  15  /  ALT  16  /  AlkPhos  62  05-02    PT/INR - ( 02 May 2018 17:33 )   PT: 11.3 SEC;   INR: 0.98          PTT - ( 02 May 2018 17:33 )  PTT:27.7 SEC    < from: CT Abdomen and Pelvis w/ IV Cont (05.02.18 @ 18:50) >    FINDINGS:    LOWER CHEST: Bilateral lower lobe dependent atelectasis.    LIVER: Within normal limits.  BILE DUCTS: Normal caliber.  GALLBLADDER: Within normal limits.  SPLEEN: Within normal limits.  PANCREAS: Within normal limits.  ADRENALS: Within normal limits.  KIDNEYS/URETERS: No hydronephrosis. Subcentimeter hypodense lesions in   the kidneys bilaterally, too small to characterize.    BLADDER: Within normal limits.  REPRODUCTIVE ORGANS: The prostate is within normal limits.    BOWEL: No bowel obstruction. Normal appendix. Diffuse colonic wall   thickening involving the transverse, descending, and sigmoid colon, as   well as the rectum.  PERITONEUM: No ascites.  VESSELS:  Within normal limits.  RETROPERITONEUM: No lymphadenopathy.    ABDOMINAL WALL: Within normal limits.  BONES: Mild degenerative changes in the spine.    IMPRESSION:Diffuse colonic wall thickening involving the transverse,   descending, and sigmoid colon, as well as the rectum, consistent with a   colitis.    < end of copied text > .  LABS:                         14.4   11.00 )-----------( 208      ( 02 May 2018 19:01 )             44.2     05-02    140  |  101  |  9   ----------------------------<  100<H>  4.3   |  26  |  1.11    Ca    9.7      02 May 2018 17:33    TPro  7.2  /  Alb  4.4  /  TBili  0.7  /  DBili  x   /  AST  15  /  ALT  16  /  AlkPhos  62  05-02    PT/INR - ( 02 May 2018 17:33 )   PT: 11.3 SEC;   INR: 0.98          PTT - ( 02 May 2018 17:33 )  PTT:27.7 SEC    < from: CT Abdomen and Pelvis w/ IV Cont (05.02.18 @ 18:50) >    FINDINGS:    LOWER CHEST: Bilateral lower lobe dependent atelectasis.    LIVER: Within normal limits.  BILE DUCTS: Normal caliber.  GALLBLADDER: Within normal limits.  SPLEEN: Within normal limits.  PANCREAS: Within normal limits.  ADRENALS: Within normal limits.  KIDNEYS/URETERS: No hydronephrosis. Subcentimeter hypodense lesions in   the kidneys bilaterally, too small to characterize.    BLADDER: Within normal limits.  REPRODUCTIVE ORGANS: The prostate is within normal limits.    BOWEL: No bowel obstruction. Normal appendix. Diffuse colonic wall   thickening involving the transverse, descending, and sigmoid colon, as   well as the rectum.  PERITONEUM: No ascites.  VESSELS:  Within normal limits.  RETROPERITONEUM: No lymphadenopathy.    ABDOMINAL WALL: Within normal limits.  BONES: Mild degenerative changes in the spine.    IMPRESSION: Diffuse colonic wall thickening involving the transverse,   descending, and sigmoid colon, as well as the rectum, consistent with a   colitis.    < end of copied text >

## 2018-05-02 NOTE — H&P ADULT - PMH
GERD (gastroesophageal reflux disease)    HTN (hypertension)    Sciatic Nerve Disease Coronary artery disease involving native coronary artery of native heart without angina pectoris    GERD (gastroesophageal reflux disease)    HTN (hypertension)    Sciatic Nerve Disease

## 2018-05-02 NOTE — H&P ADULT - ASSESSMENT
46 yo M with PMH of HTN, GERD a/w colitis. 46 yo M with PMH of HTN, GERD a/w colitis likely 2/2 infectious etiology. 48 yo M with PMH of HTN, GERD a/w colitis likely 2/2 infectious etiology vs inflammatory etiology.

## 2018-05-02 NOTE — ED PROVIDER NOTE - PHYSICAL EXAMINATION
Abd pain with palpation Abd pain with palpation  SHIRA (chaperoned by SUKUMAR Santoro) - No hemorrhoids, no fissures, red on digit.

## 2018-05-02 NOTE — H&P ADULT - NSHPPHYSICALEXAM_GEN_ALL_CORE
Vital Signs Last 24 Hrs  T(C): 36.8 (02 May 2018 20:45), Max: 36.9 (02 May 2018 16:14)  T(F): 98.2 (02 May 2018 20:45), Max: 98.4 (02 May 2018 16:14)  HR: 72 (02 May 2018 20:45) (69 - 72)  BP: 115/76 (02 May 2018 20:45) (115/76 - 140/82)  BP(mean): --  RR: 17 (02 May 2018 20:45) (16 - 17)  SpO2: 98% (02 May 2018 20:45) (98% - 98%)      PHYSICAL EXAM:  GENERAL: NAD, well-groomed, well-developed  HEAD:  Atraumatic, Normocephalic  EYES: EOMI, PERRLA, conjunctiva and sclera clear  ENMT: No tonsillar erythema, exudates, or enlargement; Moist mucous membranes,   NECK: Supple, No JVD, Normal thyroid  HEART: Regular rate and rhythm; No murmurs, rubs, or gallops  RESPIRATORY: CTA B/L, No W/R/R  ABDOMEN: Soft, Nontender, Nondistended; Bowel sounds present, no rebound or guarding  NEUROLOGY: A&Ox3, nonfocal, sensation intact  EXTREMITIES:  2+ Peripheral Pulses, No clubbing, cyanosis, or edema  SKIN: warm, dry, normal color, no rash or abnormal lesions

## 2018-05-02 NOTE — H&P ADULT - PROBLEM SELECTOR PLAN 4
-IMPROVE score 0  -no need for chemical DVT ppx  -encourage early ambualtion - Patient on aspirin 81mg daily, said that it was started while he was at [employer] due to "chest pains" but denied having any cardiac catherizations  - Has had stress tests in past but is unsure of results, last stress test here showed him to have no areas of ischemia and he had a cardiac cath in 2017 that showed nl coronaries, unclear if he has had any further cardiac testing since then and if he had any stents placed since then  - For now, would continue with aspirin as his BRBPR is likely 2/2 colitis and will monitor his CBC, would also try to clarify his cardiac history in AM - Patient on aspirin 81mg daily and metoprolol, not on statin tehrapy  - Has had stress tests in past but is unsure of results, last stress test here showed him to have no areas of ischemia and he had a cardiac cath in 2017 that showed nl coronaries, unclear if he has had any further cardiac testing since then and if he had any stents placed since then  - For now, would continue with aspirin as his BRBPR is likely 2/2 colitis and his H/H has remained at his usual levels when compared to previous lab work, will monitor his CBC q12 h, would also try to clarify his cardiac history in AM

## 2018-05-02 NOTE — H&P ADULT - PROBLEM SELECTOR PLAN 5
-IMPROVE score 0  -no need for chemical DVT ppx  -encourage early ambualtion - Patient states he is on gabapentin 2/2 chronic shoulder and lower back pain, will c/w for now

## 2018-05-02 NOTE — H&P ADULT - PROBLEM SELECTOR PLAN 1
-colonic wall thickening involving the transverse, descending, sigmoid colon, rectum, consistent with a colitis  -differential include ischemic vs inflammatory vs infectious etiology  -check stool cx, O&P, C. Diff, ESR, CRP  -c/w empirical abx coverage with cipro and flagyl  -currently HD stable  -monitor CBC  -symptomatic support PRN for pain -colonic wall thickening involving the transverse, descending, sigmoid colon, rectum, consistent with a colitis  -differential include ischemic vs inflammatory vs infectious etiology  -check stool cx, O&P, C. Diff, ESR, CRP  -c/w empirical abx coverage with cipro and flagyl  -currently HD stable  -monitor CBC  -symptomatic support PRN for pain  -clear liquid diet, advance as tolerated -colonic wall thickening involving the transverse, descending, sigmoid colon, rectum, consistent with a colitis  -likely 2/2 infectious etiology given patient's occupation as , other differential include inflammatory colitis. Ischemic colitis less likely given distribution of colonic finding on CT a/p  -check stool cx, O&P, C. Diff, ESR, CRP  -c/w empirical abx coverage with cipro and flagyl  -currently HD stable  -monitor CBC  -symptomatic support PRN for pain  -clear liquid diet, advance as tolerated -colonic wall thickening involving the transverse, descending, sigmoid colon, rectum, consistent with a colitis  -likely 2/2 infectious etiology given patient's occupation as , other differential include inflammatory colitis. Ischemic colitis less likely given distribution of colonic finding on CT a/p  -check stool cx, O&P, ESR, CRP  -c/w empirical abx coverage with cipro and flagyl  -currently HD stable  -monitor CBC  -symptomatic support PRN for pain  -clear liquid diet, advance as tolerated -colonic wall thickening involving the transverse, descending, sigmoid colon, rectum, consistent with a colitis  -likely 2/2 infectious etiology given patient's occupation as , other differential include inflammatory colitis. Ischemic colitis less likely given distribution of colonic finding on CT a/p  -check stool cx, O&P, ESR, CRP  -c/w empirical abx coverage with cipro and flagyl  -currently HD stable  -monitor CBC  -symptomatic support PRN for pain  -clear liquid diet, advance as tolerated  -GI eval

## 2018-05-02 NOTE — H&P ADULT - HISTORY OF PRESENT ILLNESS
46 yo M with PMH of HTN, GERD presents for abdominal pain for one day. Pt reports one hour after eating his breakfast, he started to have acute onset of sharp crampy nonradiating abdominal pain that was 8/10. Pt went to bathroom and had a BM soft stool with bright red blood. Pt has had 13 soft BM today. No associated f/c, n/v. ROS is notable for weakness. He reports able to tolerate soup today. No hx of similar abdominal pain in the past. He endorses being on daily ASA with no other blood thining medication or herbal supplement. No CP, palpitation, sick contact, recent traveling, urinary symptoms.     In ED, T 98.4, HR 69, /82, RR 16, Sat 98 RA. CT a/p noted diffuse colonic wall thicking involving the transverse, descending, sigmoid colon, rectum indicative of colitis. Pt received cipro, flagyl, NS x1L, morphine 4mg x1. 46 yo M with PMH of HTN, GERD presents for abdominal pain for one day. Pt reports one hour after eating his breakfast, he started to have acute onset of sharp crampy nonradiating abdominal pain that was 8/10. Pt went to bathroom and had a BM soft stool with bright red blood. Pt has had 13 soft BM today. No associated f/c, n/v. ROS is notable for weakness. He reports able to tolerate soup today. No hx of similar abdominal pain or bleeding in the past. He endorses being on daily ASA with no other blood thining medication or herbal supplement. No CP, palpitation, sick contact, recent traveling, urinary symptoms.     In ED, T 98.4, HR 69, /82, RR 16, Sat 98 RA. CT a/p noted diffuse colonic wall thicking involving the transverse, descending, sigmoid colon, and rectum indicative of colitis. Pt received cipro, flagyl, NS x1L, morphine 4mg x1. 46 yo M with PMH of CAD with ?PCI (pt unsure, previous records state he had 3 stents), HTN, GERD presents for abdominal pain for one day. Pt reports one hour after eating his breakfast, he started to have acute onset of sharp crampy nonradiating abdominal pain that was 8/10. Pt went to bathroom and had a BM soft stool with bright red blood. Pt has had 13 soft BM today. No associated f/c, n/v. ROS is notable for weakness. He reports able to tolerate soup today. No hx of similar abdominal pain or bleeding in the past. He endorses being on daily ASA with no other blood thining medication or herbal supplement. No CP, palpitation, sick contact, recent traveling, urinary symptoms.     In ED, T 98.4, HR 69, /82, RR 16, Sat 98 RA. CT a/p noted diffuse colonic wall thicking involving the transverse, descending, sigmoid colon, and rectum indicative of colitis. Pt received cipro, flagyl, NS x1L, morphine 4mg x1. Please note, patient is a limited English speaker, prefers Amharic     46 yo M with PMH of CAD with ?PCI (pt unsure, previous records state he had 3 stents), HTN, GERD presents for abdominal pain for one day. Pt reports one hour after eating his breakfast, he started to have acute onset of sharp crampy nonradiating abdominal pain that was 8/10. Pt went to bathroom and had a BM soft stool with bright red blood. Pt has had 13 soft BM today. No associated f/c, n/v. ROS is notable for weakness. He reports able to tolerate soup today. No hx of similar abdominal pain or bleeding in the past. He endorses being on daily ASA with no other blood thining medication or herbal supplement. No CP, palpitation, sick contact, recent traveling, urinary symptoms.     In ED, T 98.4, HR 69, /82, RR 16, Sat 98 RA. CT a/p noted diffuse colonic wall thickening involving the transverse, descending, sigmoid colon, and rectum indicative of colitis. Pt received cipro, flagyl, NS x1L, morphine 4mg x1. Please note, patient is a limited English speaker, prefers Icelandic     48 yo M with PMH of CAD with ?PCI (pt unsure, previous records state he had 3 stents), HTN, GERD presents for abdominal pain for one day. Pt reports one hour after eating his breakfast, he started to have acute onset of sharp crampy nonradiating abdominal pain that was 8/10. Pt went to bathroom and had a BM soft stool with bright red blood. Pt has had 13 soft BM today with blood. No associated f/c, n/v. ROS is notable for weakness. He reports able to tolerate soup today. No hx of similar abdominal pain or bleeding in the past. He endorses being on daily ASA with no other blood thinning medication or herbal supplement. No CP, palpitation, sick contact, recent traveling, urinary symptoms.     In ED, T 98.4, HR 69, /82, RR 16, Sat 98 RA. CT a/p noted diffuse colonic wall thickening involving the transverse, descending, sigmoid colon, and rectum indicative of colitis. Pt received cipro, flagyl, NS x1L, morphine 4mg x1.

## 2018-05-02 NOTE — H&P ADULT - NSHPREVIEWOFSYSTEMS_GEN_ALL_CORE
REVIEW OF SYSTEMS:    CONSTITUTIONAL: +Generalized weakness, No fevers or chills  EYES/ENT: No visual changes;  No dysphagia  NECK: No pain or stiffness  RESPIRATORY: No cough, wheezing, hemoptysis; No shortness of breath  CARDIOVASCULAR: No chest pain or palpitations; No lower extremity edema  GASTROINTESTINAL: +soft stools with BRBPR  BACK: No back pain  GENITOURINARY: No dysuria, frequency or hematuria  NEUROLOGICAL: No numbness or weakness  SKIN: No itching, burning, rashes, or lesions   All other review of systems is negative unless indicated above. REVIEW OF SYSTEMS:    CONSTITUTIONAL: +Generalized weakness, +Chills, no fevers, no changes in weight  EYES/ENT: No visual changes;  No dysphagia  NECK: No pain or stiffness  RESPIRATORY: No cough, wheezing, hemoptysis; No shortness of breath  CARDIOVASCULAR: No chest pain or palpitations; No lower extremity edema  GASTROINTESTINAL: +soft stools with BRBPR  BACK: No back pain  GENITOURINARY: No dysuria, frequency or hematuria  NEUROLOGICAL: No numbness or weakness  SKIN: No itching, burning, rashes, or lesions   All other review of systems is negative unless indicated above.

## 2018-05-02 NOTE — H&P ADULT - PROBLEM SELECTOR PROBLEM 4
Need for prophylactic measure Medication management Coronary artery disease involving native coronary artery of native heart without angina pectoris

## 2018-05-02 NOTE — ED PROVIDER NOTE - OBJECTIVE STATEMENT
49M presenting with abd pain diffuse since this morning with BM x 12 with blood, BRBPR. Pt takes ASA 81 every morning and no other ACs. No pain like this before. No fever or chills. No nausea or vomiting. Feel weak and dizzy. Feels throat is dry. Last PO was 1pm.

## 2018-05-02 NOTE — H&P ADULT - ATTENDING COMMENTS
Patient seen and examined on 5/3/18, case discussed with Dr. Merritt, agree with assessment and plan as above.

## 2018-05-02 NOTE — ED ADULT TRIAGE NOTE - CHIEF COMPLAINT QUOTE
Pt c/o abd pain and blood in stool since this am.  Pt stated he" saw blood 11 times".  Denies chest pain, sob, dizziness

## 2018-05-03 DIAGNOSIS — K52.9 NONINFECTIVE GASTROENTERITIS AND COLITIS, UNSPECIFIED: ICD-10-CM

## 2018-05-03 DIAGNOSIS — I25.10 ATHEROSCLEROTIC HEART DISEASE OF NATIVE CORONARY ARTERY WITHOUT ANGINA PECTORIS: ICD-10-CM

## 2018-05-03 DIAGNOSIS — Z29.9 ENCOUNTER FOR PROPHYLACTIC MEASURES, UNSPECIFIED: ICD-10-CM

## 2018-05-03 DIAGNOSIS — I10 ESSENTIAL (PRIMARY) HYPERTENSION: ICD-10-CM

## 2018-05-03 DIAGNOSIS — K21.9 GASTRO-ESOPHAGEAL REFLUX DISEASE WITHOUT ESOPHAGITIS: ICD-10-CM

## 2018-05-03 DIAGNOSIS — Z79.899 OTHER LONG TERM (CURRENT) DRUG THERAPY: ICD-10-CM

## 2018-05-03 DIAGNOSIS — G89.29 OTHER CHRONIC PAIN: ICD-10-CM

## 2018-05-03 LAB
ALBUMIN SERPL ELPH-MCNC: 3.7 G/DL — SIGNIFICANT CHANGE UP (ref 3.3–5)
ALP SERPL-CCNC: 62 U/L — SIGNIFICANT CHANGE UP (ref 40–120)
ALT FLD-CCNC: 15 U/L — SIGNIFICANT CHANGE UP (ref 4–41)
AST SERPL-CCNC: 14 U/L — SIGNIFICANT CHANGE UP (ref 4–40)
BASOPHILS # BLD AUTO: 0.03 K/UL — SIGNIFICANT CHANGE UP (ref 0–0.2)
BASOPHILS NFR BLD AUTO: 0.3 % — SIGNIFICANT CHANGE UP (ref 0–2)
BILIRUB SERPL-MCNC: 1.5 MG/DL — HIGH (ref 0.2–1.2)
BUN SERPL-MCNC: 9 MG/DL — SIGNIFICANT CHANGE UP (ref 7–23)
CALCIUM SERPL-MCNC: 8.8 MG/DL — SIGNIFICANT CHANGE UP (ref 8.4–10.5)
CHLORIDE SERPL-SCNC: 103 MMOL/L — SIGNIFICANT CHANGE UP (ref 98–107)
CO2 SERPL-SCNC: 26 MMOL/L — SIGNIFICANT CHANGE UP (ref 22–31)
CREAT SERPL-MCNC: 1.07 MG/DL — SIGNIFICANT CHANGE UP (ref 0.5–1.3)
CRP SERPL-MCNC: 6.9 MG/L — HIGH
EOSINOPHIL # BLD AUTO: 0.03 K/UL — SIGNIFICANT CHANGE UP (ref 0–0.5)
EOSINOPHIL NFR BLD AUTO: 0.3 % — SIGNIFICANT CHANGE UP (ref 0–6)
ERYTHROCYTE [SEDIMENTATION RATE] IN BLOOD: 7 MM/HR — SIGNIFICANT CHANGE UP (ref 1–15)
GLUCOSE SERPL-MCNC: 98 MG/DL — SIGNIFICANT CHANGE UP (ref 70–99)
HCT VFR BLD CALC: 43.6 % — SIGNIFICANT CHANGE UP (ref 39–50)
HCT VFR BLD CALC: 46.5 % — SIGNIFICANT CHANGE UP (ref 39–50)
HGB BLD-MCNC: 14.3 G/DL — SIGNIFICANT CHANGE UP (ref 13–17)
HGB BLD-MCNC: 15 G/DL — SIGNIFICANT CHANGE UP (ref 13–17)
IMM GRANULOCYTES # BLD AUTO: 0.03 # — SIGNIFICANT CHANGE UP
IMM GRANULOCYTES NFR BLD AUTO: 0.3 % — SIGNIFICANT CHANGE UP (ref 0–1.5)
LYMPHOCYTES # BLD AUTO: 2.24 K/UL — SIGNIFICANT CHANGE UP (ref 1–3.3)
LYMPHOCYTES # BLD AUTO: 25.5 % — SIGNIFICANT CHANGE UP (ref 13–44)
MAGNESIUM SERPL-MCNC: 1.9 MG/DL — SIGNIFICANT CHANGE UP (ref 1.6–2.6)
MCHC RBC-ENTMCNC: 27.3 PG — SIGNIFICANT CHANGE UP (ref 27–34)
MCHC RBC-ENTMCNC: 27.3 PG — SIGNIFICANT CHANGE UP (ref 27–34)
MCHC RBC-ENTMCNC: 32.3 % — SIGNIFICANT CHANGE UP (ref 32–36)
MCHC RBC-ENTMCNC: 32.8 % — SIGNIFICANT CHANGE UP (ref 32–36)
MCV RBC AUTO: 83.2 FL — SIGNIFICANT CHANGE UP (ref 80–100)
MCV RBC AUTO: 84.7 FL — SIGNIFICANT CHANGE UP (ref 80–100)
MONOCYTES # BLD AUTO: 0.7 K/UL — SIGNIFICANT CHANGE UP (ref 0–0.9)
MONOCYTES NFR BLD AUTO: 8 % — SIGNIFICANT CHANGE UP (ref 2–14)
NEUTROPHILS # BLD AUTO: 5.76 K/UL — SIGNIFICANT CHANGE UP (ref 1.8–7.4)
NEUTROPHILS NFR BLD AUTO: 65.6 % — SIGNIFICANT CHANGE UP (ref 43–77)
NRBC # FLD: 0 — SIGNIFICANT CHANGE UP
NRBC # FLD: 0 — SIGNIFICANT CHANGE UP
PHOSPHATE SERPL-MCNC: 3.3 MG/DL — SIGNIFICANT CHANGE UP (ref 2.5–4.5)
PLATELET # BLD AUTO: 191 K/UL — SIGNIFICANT CHANGE UP (ref 150–400)
PLATELET # BLD AUTO: 208 K/UL — SIGNIFICANT CHANGE UP (ref 150–400)
PMV BLD: 11.4 FL — SIGNIFICANT CHANGE UP (ref 7–13)
PMV BLD: 12 FL — SIGNIFICANT CHANGE UP (ref 7–13)
POTASSIUM SERPL-MCNC: 4.5 MMOL/L — SIGNIFICANT CHANGE UP (ref 3.5–5.3)
POTASSIUM SERPL-SCNC: 4.5 MMOL/L — SIGNIFICANT CHANGE UP (ref 3.5–5.3)
PROT SERPL-MCNC: 6 G/DL — SIGNIFICANT CHANGE UP (ref 6–8.3)
RBC # BLD: 5.24 M/UL — SIGNIFICANT CHANGE UP (ref 4.2–5.8)
RBC # BLD: 5.49 M/UL — SIGNIFICANT CHANGE UP (ref 4.2–5.8)
RBC # FLD: 12.9 % — SIGNIFICANT CHANGE UP (ref 10.3–14.5)
RBC # FLD: 13.1 % — SIGNIFICANT CHANGE UP (ref 10.3–14.5)
SODIUM SERPL-SCNC: 139 MMOL/L — SIGNIFICANT CHANGE UP (ref 135–145)
WBC # BLD: 6.8 K/UL — SIGNIFICANT CHANGE UP (ref 3.8–10.5)
WBC # BLD: 8.79 K/UL — SIGNIFICANT CHANGE UP (ref 3.8–10.5)
WBC # FLD AUTO: 6.8 K/UL — SIGNIFICANT CHANGE UP (ref 3.8–10.5)
WBC # FLD AUTO: 8.79 K/UL — SIGNIFICANT CHANGE UP (ref 3.8–10.5)

## 2018-05-03 PROCEDURE — 99223 1ST HOSP IP/OBS HIGH 75: CPT | Mod: GC

## 2018-05-03 PROCEDURE — 99222 1ST HOSP IP/OBS MODERATE 55: CPT | Mod: GC

## 2018-05-03 RX ORDER — ASPIRIN/CALCIUM CARB/MAGNESIUM 324 MG
81 TABLET ORAL DAILY
Qty: 0 | Refills: 0 | Status: DISCONTINUED | OUTPATIENT
Start: 2018-05-03 | End: 2018-05-03

## 2018-05-03 RX ORDER — ACETAMINOPHEN 500 MG
650 TABLET ORAL EVERY 6 HOURS
Qty: 0 | Refills: 0 | Status: DISCONTINUED | OUTPATIENT
Start: 2018-05-03 | End: 2018-05-05

## 2018-05-03 RX ORDER — CIPROFLOXACIN LACTATE 400MG/40ML
400 VIAL (ML) INTRAVENOUS EVERY 12 HOURS
Qty: 0 | Refills: 0 | Status: DISCONTINUED | OUTPATIENT
Start: 2018-05-03 | End: 2018-05-05

## 2018-05-03 RX ORDER — METRONIDAZOLE 500 MG
500 TABLET ORAL EVERY 8 HOURS
Qty: 0 | Refills: 0 | Status: DISCONTINUED | OUTPATIENT
Start: 2018-05-03 | End: 2018-05-05

## 2018-05-03 RX ORDER — MORPHINE SULFATE 50 MG/1
2 CAPSULE, EXTENDED RELEASE ORAL EVERY 4 HOURS
Qty: 0 | Refills: 0 | Status: DISCONTINUED | OUTPATIENT
Start: 2018-05-03 | End: 2018-05-05

## 2018-05-03 RX ORDER — ASPIRIN/CALCIUM CARB/MAGNESIUM 324 MG
81 TABLET ORAL DAILY
Qty: 0 | Refills: 0 | Status: DISCONTINUED | OUTPATIENT
Start: 2018-05-03 | End: 2018-05-05

## 2018-05-03 RX ORDER — PANTOPRAZOLE SODIUM 20 MG/1
40 TABLET, DELAYED RELEASE ORAL
Qty: 0 | Refills: 0 | Status: DISCONTINUED | OUTPATIENT
Start: 2018-05-03 | End: 2018-05-05

## 2018-05-03 RX ORDER — METOPROLOL TARTRATE 50 MG
25 TABLET ORAL DAILY
Qty: 0 | Refills: 0 | Status: DISCONTINUED | OUTPATIENT
Start: 2018-05-03 | End: 2018-05-05

## 2018-05-03 RX ORDER — GABAPENTIN 400 MG/1
600 CAPSULE ORAL THREE TIMES A DAY
Qty: 0 | Refills: 0 | Status: DISCONTINUED | OUTPATIENT
Start: 2018-05-03 | End: 2018-05-05

## 2018-05-03 RX ADMIN — Medication 100 MILLIGRAM(S): at 13:41

## 2018-05-03 RX ADMIN — Medication 81 MILLIGRAM(S): at 11:55

## 2018-05-03 RX ADMIN — PANTOPRAZOLE SODIUM 40 MILLIGRAM(S): 20 TABLET, DELAYED RELEASE ORAL at 07:29

## 2018-05-03 RX ADMIN — Medication 100 MILLIGRAM(S): at 05:53

## 2018-05-03 RX ADMIN — GABAPENTIN 600 MILLIGRAM(S): 400 CAPSULE ORAL at 13:49

## 2018-05-03 RX ADMIN — Medication 200 MILLIGRAM(S): at 11:52

## 2018-05-03 RX ADMIN — Medication 25 MILLIGRAM(S): at 05:39

## 2018-05-03 RX ADMIN — GABAPENTIN 600 MILLIGRAM(S): 400 CAPSULE ORAL at 21:42

## 2018-05-03 RX ADMIN — Medication 100 MILLIGRAM(S): at 21:42

## 2018-05-03 RX ADMIN — GABAPENTIN 600 MILLIGRAM(S): 400 CAPSULE ORAL at 05:39

## 2018-05-03 RX ADMIN — Medication 200 MILLIGRAM(S): at 18:34

## 2018-05-03 NOTE — CONSULT NOTE ADULT - ASSESSMENT
Impression:    1. Likely colitis given bloody diarrhea, crampy abdominal pain. CT imaging showing transverse and L colon thickening. Ddx includes a bacterial infection (E.coli) vs. ischemic colitis, less likely IBD given acute onset. It is unusual for ischemic colitis to involve the rectum.     2. Family history of colon cancer in a first degree relative. No history of other [place] associated cancers.    3. Reported GERD on PPI      Recommendation:  -check a cdiff  -f/u stool culture  -supportive care  -can send a GI PCR  -if patient does not clinically improve can consider flex sig to r/o ischemic colitis  -the patient and his siblings should all have colonoscopy starting at age 40 (in this case the patient should wait a few weeks for resolution of acute infection) Impression:    1. Likely colitis given bloody diarrhea, crampy abdominal pain. CT imaging showing transverse and L colon thickening. Ddx includes a bacterial infection (E.coli,cdiff) vs. ischemic colitis, less likely IBD given acute onset. It is unusual for ischemic colitis to involve the rectum.     2. Family history of colon cancer in a first degree relative. No history of other [place] associated cancers.    3. Reported GERD on PPI      Recommendation:  -check a cdiff  -f/u stool culture  -supportive care  -can send a GI PCR  -if patient does not clinically improve can consider flex sig to r/o ischemic colitis  -the patient and his siblings should all have colonoscopy starting at age 40 (in this case the patient should wait a few weeks for resolution of acute infection)

## 2018-05-03 NOTE — CONSULT NOTE ADULT - SUBJECTIVE AND OBJECTIVE BOX
Chief Complaint:  Patient is a 47y old  Male who presents with a chief complaint of bloody diarrhea      HPI:  This is a 47 year old man with a history of CAD s/p PCI (5 y ago, on ASA), HTN, GERD who presents with 1 day of bloody diarrhea. The patient experienced acute onset periumbilical cramping pain at 9am yesterday. Throughout the rest of the day he had about 10 episodes of diarrhea mixed with red blood. He has never experienced this before. He had a subjective fever. He has chronic lower abdominal burning discomfort relieved with BM but his weight has been stable and he has no chronic diarrhea. He has not traveled recently or had any sick contacts. He has not eaten any karol lettuces or street food. He has not had antibiotics recently. He has never had a colonoscopy. He is currently feeling much better and has not moved his bowels since yesterday.      Allergies:  No Known Allergies      Home Medications:    Hospital Medications:  acetaminophen   Tablet. 650 milliGRAM(s) Oral every 6 hours PRN  aspirin  chewable 81 milliGRAM(s) Oral daily  ciprofloxacin   IVPB 400 milliGRAM(s) IV Intermittent every 12 hours  gabapentin 600 milliGRAM(s) Oral three times a day  metoprolol succinate ER 25 milliGRAM(s) Oral daily  metroNIDAZOLE  IVPB 500 milliGRAM(s) IV Intermittent every 8 hours  morphine  - Injectable 2 milliGRAM(s) IV Push every 4 hours PRN  pantoprazole    Tablet 40 milliGRAM(s) Oral before breakfast      PMHX/PSHX:  Coronary artery disease involving native coronary artery of native heart without angina pectoris  CAD (coronary artery disease)  GERD (gastroesophageal reflux disease)  HTN (hypertension)  Sciatic Nerve Disease  Stented coronary artery  S/P transurethral resection of prostate      Family history:  no fhx of IBD  Father with CRC at age 65      Social History:   nonsmoker nondrinker  ROS:       Eyes:  Good vision, no reported pain  ENT:  No sore throat, pain, runny nose, dysphagia  CV:  No pain, palpitations, hypo/hypertension  Resp:  No dyspnea, cough, tachypnea, wheezing  GI:  See HPI  :  No pain, bleeding, incontinence, nocturia  Muscle:  No pain, weakness  Neuro:  No weakness, tingling, memory problems  Psych:  No fatigue, insomnia, mood problems, depression  Endocrine:  No polyuria, polydipsia, cold/heat intolerance  Heme:  No petechiae, ecchymosis, easy bruisability  Skin:  No rash, edema      PHYSICAL EXAM:     GENERAL:  Appears stated age, well-groomed, well-nourished, no distress  HEENT:  NC/AT,  conjunctivae clear and pink,  no JVD  CHEST:  Full & symmetric excursion, no increased effort, breath sounds clear  HEART:  Regular rhythm, S1, S2, no murmur/rub/S3/S4, no abdominal bruit, no edema  ABDOMEN:  Soft, minimal epigastric-tenderness, slightly distended, normoactive bowel sounds,  no masses , no hepatosplenomegaly  EXTREMITIES:  no cyanosis,clubbing or edema  SKIN:  ?lip herpetic lesion  NEURO:  Alert, oriented    Vital Signs:  Vital Signs Last 24 Hrs  T(C): 36.3 (03 May 2018 05:38), Max: 36.9 (02 May 2018 16:14)  T(F): 97.3 (03 May 2018 05:38), Max: 98.4 (02 May 2018 16:14)  HR: 68 (03 May 2018 05:38) (59 - 72)  BP: 115/76 (03 May 2018 05:38) (112/73 - 140/82)  BP(mean): --  RR: 18 (03 May 2018 05:38) (16 - 18)  SpO2: 97% (03 May 2018 05:38) (97% - 99%)  Daily     Daily     LABS:                        14.3   8.79  )-----------( 191      ( 03 May 2018 05:35 )             43.6     05-03    139  |  103  |  9   ----------------------------<  98  4.5   |  26  |  1.07    Ca    8.8      03 May 2018 05:35  Phos  3.3     05-03  Mg     1.9     05-03    TPro  6.0  /  Alb  3.7  /  TBili  1.5<H>  /  DBili  x   /  AST  14  /  ALT  15  /  AlkPhos  62  05-03    LIVER FUNCTIONS - ( 03 May 2018 05:35 )  Alb: 3.7 g/dL / Pro: 6.0 g/dL / ALK PHOS: 62 u/L / ALT: 15 u/L / AST: 14 u/L / GGT: x           PT/INR - ( 02 May 2018 17:33 )   PT: 11.3 SEC;   INR: 0.98          PTT - ( 02 May 2018 17:33 )  PTT:27.7 SEC    Amylase Serum--      Lipase serum36.1       Ammonia--      Imaging:

## 2018-05-03 NOTE — ED ADULT NURSE REASSESSMENT NOTE - NS ED NURSE REASSESS COMMENT FT1
pt. currently asleep but easily arousable, in NAD, breathes with ease, denies any pain at this time. morning labs drawn as ordered. meds given as ordered. pt. admitted, awaits bed. will continue to monitor

## 2018-05-04 LAB
ALBUMIN SERPL ELPH-MCNC: 3.7 G/DL — SIGNIFICANT CHANGE UP (ref 3.3–5)
ALP SERPL-CCNC: 62 U/L — SIGNIFICANT CHANGE UP (ref 40–120)
ALT FLD-CCNC: 13 U/L — SIGNIFICANT CHANGE UP (ref 4–41)
AST SERPL-CCNC: 13 U/L — SIGNIFICANT CHANGE UP (ref 4–40)
BASOPHILS # BLD AUTO: 0.02 K/UL — SIGNIFICANT CHANGE UP (ref 0–0.2)
BASOPHILS NFR BLD AUTO: 0.3 % — SIGNIFICANT CHANGE UP (ref 0–2)
BILIRUB SERPL-MCNC: 1.5 MG/DL — HIGH (ref 0.2–1.2)
BUN SERPL-MCNC: 8 MG/DL — SIGNIFICANT CHANGE UP (ref 7–23)
CALCIUM SERPL-MCNC: 8.8 MG/DL — SIGNIFICANT CHANGE UP (ref 8.4–10.5)
CHLORIDE SERPL-SCNC: 102 MMOL/L — SIGNIFICANT CHANGE UP (ref 98–107)
CO2 SERPL-SCNC: 25 MMOL/L — SIGNIFICANT CHANGE UP (ref 22–31)
CREAT SERPL-MCNC: 1.11 MG/DL — SIGNIFICANT CHANGE UP (ref 0.5–1.3)
EOSINOPHIL # BLD AUTO: 0.05 K/UL — SIGNIFICANT CHANGE UP (ref 0–0.5)
EOSINOPHIL NFR BLD AUTO: 0.8 % — SIGNIFICANT CHANGE UP (ref 0–6)
GLUCOSE SERPL-MCNC: 98 MG/DL — SIGNIFICANT CHANGE UP (ref 70–99)
HCT VFR BLD CALC: 44.7 % — SIGNIFICANT CHANGE UP (ref 39–50)
HCT VFR BLD CALC: 45.5 % — SIGNIFICANT CHANGE UP (ref 39–50)
HGB BLD-MCNC: 14.6 G/DL — SIGNIFICANT CHANGE UP (ref 13–17)
HGB BLD-MCNC: 14.8 G/DL — SIGNIFICANT CHANGE UP (ref 13–17)
IMM GRANULOCYTES # BLD AUTO: 0.04 # — SIGNIFICANT CHANGE UP
IMM GRANULOCYTES NFR BLD AUTO: 0.6 % — SIGNIFICANT CHANGE UP (ref 0–1.5)
LYMPHOCYTES # BLD AUTO: 1.74 K/UL — SIGNIFICANT CHANGE UP (ref 1–3.3)
LYMPHOCYTES # BLD AUTO: 26.6 % — SIGNIFICANT CHANGE UP (ref 13–44)
MCHC RBC-ENTMCNC: 27.2 PG — SIGNIFICANT CHANGE UP (ref 27–34)
MCHC RBC-ENTMCNC: 27.8 PG — SIGNIFICANT CHANGE UP (ref 27–34)
MCHC RBC-ENTMCNC: 32.1 % — SIGNIFICANT CHANGE UP (ref 32–36)
MCHC RBC-ENTMCNC: 33.1 % — SIGNIFICANT CHANGE UP (ref 32–36)
MCV RBC AUTO: 83.9 FL — SIGNIFICANT CHANGE UP (ref 80–100)
MCV RBC AUTO: 84.9 FL — SIGNIFICANT CHANGE UP (ref 80–100)
MONOCYTES # BLD AUTO: 0.56 K/UL — SIGNIFICANT CHANGE UP (ref 0–0.9)
MONOCYTES NFR BLD AUTO: 8.6 % — SIGNIFICANT CHANGE UP (ref 2–14)
NEUTROPHILS # BLD AUTO: 4.13 K/UL — SIGNIFICANT CHANGE UP (ref 1.8–7.4)
NEUTROPHILS NFR BLD AUTO: 63.1 % — SIGNIFICANT CHANGE UP (ref 43–77)
NRBC # FLD: 0 — SIGNIFICANT CHANGE UP
NRBC # FLD: 0 — SIGNIFICANT CHANGE UP
PLATELET # BLD AUTO: 200 K/UL — SIGNIFICANT CHANGE UP (ref 150–400)
PLATELET # BLD AUTO: 201 K/UL — SIGNIFICANT CHANGE UP (ref 150–400)
PMV BLD: 11.3 FL — SIGNIFICANT CHANGE UP (ref 7–13)
PMV BLD: 11.8 FL — SIGNIFICANT CHANGE UP (ref 7–13)
POTASSIUM SERPL-MCNC: 4.3 MMOL/L — SIGNIFICANT CHANGE UP (ref 3.5–5.3)
POTASSIUM SERPL-SCNC: 4.3 MMOL/L — SIGNIFICANT CHANGE UP (ref 3.5–5.3)
PROT SERPL-MCNC: 6.1 G/DL — SIGNIFICANT CHANGE UP (ref 6–8.3)
RBC # BLD: 5.33 M/UL — SIGNIFICANT CHANGE UP (ref 4.2–5.8)
RBC # BLD: 5.36 M/UL — SIGNIFICANT CHANGE UP (ref 4.2–5.8)
RBC # FLD: 12.8 % — SIGNIFICANT CHANGE UP (ref 10.3–14.5)
RBC # FLD: 13.1 % — SIGNIFICANT CHANGE UP (ref 10.3–14.5)
SODIUM SERPL-SCNC: 140 MMOL/L — SIGNIFICANT CHANGE UP (ref 135–145)
SPECIMEN SOURCE: SIGNIFICANT CHANGE UP
WBC # BLD: 6.54 K/UL — SIGNIFICANT CHANGE UP (ref 3.8–10.5)
WBC # BLD: 6.84 K/UL — SIGNIFICANT CHANGE UP (ref 3.8–10.5)
WBC # FLD AUTO: 6.54 K/UL — SIGNIFICANT CHANGE UP (ref 3.8–10.5)
WBC # FLD AUTO: 6.84 K/UL — SIGNIFICANT CHANGE UP (ref 3.8–10.5)

## 2018-05-04 PROCEDURE — 99232 SBSQ HOSP IP/OBS MODERATE 35: CPT | Mod: GC

## 2018-05-04 RX ADMIN — GABAPENTIN 600 MILLIGRAM(S): 400 CAPSULE ORAL at 22:37

## 2018-05-04 RX ADMIN — GABAPENTIN 600 MILLIGRAM(S): 400 CAPSULE ORAL at 13:45

## 2018-05-04 RX ADMIN — PANTOPRAZOLE SODIUM 40 MILLIGRAM(S): 20 TABLET, DELAYED RELEASE ORAL at 06:10

## 2018-05-04 RX ADMIN — GABAPENTIN 600 MILLIGRAM(S): 400 CAPSULE ORAL at 05:14

## 2018-05-04 RX ADMIN — Medication 200 MILLIGRAM(S): at 06:10

## 2018-05-04 RX ADMIN — Medication 100 MILLIGRAM(S): at 05:14

## 2018-05-04 RX ADMIN — Medication 25 MILLIGRAM(S): at 05:14

## 2018-05-04 RX ADMIN — Medication 100 MILLIGRAM(S): at 22:38

## 2018-05-04 RX ADMIN — Medication 200 MILLIGRAM(S): at 18:21

## 2018-05-04 RX ADMIN — Medication 100 MILLIGRAM(S): at 13:45

## 2018-05-04 RX ADMIN — Medication 81 MILLIGRAM(S): at 13:45

## 2018-05-05 ENCOUNTER — TRANSCRIPTION ENCOUNTER (OUTPATIENT)
Age: 48
End: 2018-05-05

## 2018-05-05 VITALS
HEART RATE: 61 BPM | DIASTOLIC BLOOD PRESSURE: 68 MMHG | SYSTOLIC BLOOD PRESSURE: 103 MMHG | OXYGEN SATURATION: 100 % | RESPIRATION RATE: 10 BRPM | TEMPERATURE: 98 F

## 2018-05-05 LAB
BACTERIA STL CULT: SIGNIFICANT CHANGE UP
HCT VFR BLD CALC: 44.4 % — SIGNIFICANT CHANGE UP (ref 39–50)
HGB BLD-MCNC: 14.7 G/DL — SIGNIFICANT CHANGE UP (ref 13–17)
MCHC RBC-ENTMCNC: 27.8 PG — SIGNIFICANT CHANGE UP (ref 27–34)
MCHC RBC-ENTMCNC: 33.1 % — SIGNIFICANT CHANGE UP (ref 32–36)
MCV RBC AUTO: 83.9 FL — SIGNIFICANT CHANGE UP (ref 80–100)
NRBC # FLD: 0 — SIGNIFICANT CHANGE UP
PLATELET # BLD AUTO: 195 K/UL — SIGNIFICANT CHANGE UP (ref 150–400)
PMV BLD: 11.9 FL — SIGNIFICANT CHANGE UP (ref 7–13)
RBC # BLD: 5.29 M/UL — SIGNIFICANT CHANGE UP (ref 4.2–5.8)
RBC # FLD: 13 % — SIGNIFICANT CHANGE UP (ref 10.3–14.5)
WBC # BLD: 6.39 K/UL — SIGNIFICANT CHANGE UP (ref 3.8–10.5)
WBC # FLD AUTO: 6.39 K/UL — SIGNIFICANT CHANGE UP (ref 3.8–10.5)

## 2018-05-05 RX ORDER — ACETAMINOPHEN 500 MG
2 TABLET ORAL
Qty: 0 | Refills: 0 | DISCHARGE
Start: 2018-05-05

## 2018-05-05 RX ORDER — LACTOBACILLUS ACIDOPHILUS 100MM CELL
1 CAPSULE ORAL
Qty: 60 | Refills: 0
Start: 2018-05-05 | End: 2018-06-03

## 2018-05-05 RX ORDER — MOXIFLOXACIN HYDROCHLORIDE TABLETS, 400 MG 400 MG/1
1 TABLET, FILM COATED ORAL
Qty: 21 | Refills: 0
Start: 2018-05-05 | End: 2018-05-14

## 2018-05-05 RX ORDER — METRONIDAZOLE 500 MG
1 TABLET ORAL
Qty: 31 | Refills: 0
Start: 2018-05-05 | End: 2018-05-14

## 2018-05-05 RX ADMIN — Medication 25 MILLIGRAM(S): at 10:11

## 2018-05-05 RX ADMIN — GABAPENTIN 600 MILLIGRAM(S): 400 CAPSULE ORAL at 13:15

## 2018-05-05 RX ADMIN — Medication 81 MILLIGRAM(S): at 13:15

## 2018-05-05 RX ADMIN — Medication 200 MILLIGRAM(S): at 05:36

## 2018-05-05 RX ADMIN — PANTOPRAZOLE SODIUM 40 MILLIGRAM(S): 20 TABLET, DELAYED RELEASE ORAL at 05:37

## 2018-05-05 RX ADMIN — GABAPENTIN 600 MILLIGRAM(S): 400 CAPSULE ORAL at 05:37

## 2018-05-05 RX ADMIN — Medication 100 MILLIGRAM(S): at 06:52

## 2018-05-05 RX ADMIN — Medication 100 MILLIGRAM(S): at 13:19

## 2018-05-05 NOTE — DISCHARGE NOTE ADULT - PATIENT PORTAL LINK FT
You can access the [facility] Patient Portal, offered by [facility], by registering with the following website: [email or web address]

## 2018-05-05 NOTE — DISCHARGE NOTE ADULT - ADDITIONAL INSTRUCTIONS
Call outpatient GI to arrange for colonscopy when feeling better Call GI Dr Mendenhall to arrange for colonoscopy when feeling better and completed taking antibiotics.

## 2018-05-05 NOTE — DISCHARGE NOTE ADULT - HOSPITAL COURSE
48 yo M with PMH of HTN, GERD, Sciatic nerve disease s/p TURP 2012  admitted with  colitis likely 2/2 infectious etiology.  -CT showed colonic wall thickening involving the transverse, descending, sigmoid colon, rectum, consistent with a colitis. GI was consulted and patient was started on cipro/flagyl with improvment of his symptoms.    Pt to be discharged with cipro and flagyl for total of 14 days   Pt is on low residue diet lactose free.  Pt to follow up as outpatient for colonoscopy when condition improved - number for clinic provided   There is a family history of CRC in a first degree relative older than age 60 and it is advised that pt and his siblings have colonoscopies starting at the age of 40.  Pt will have colonoscopy when clinically improved   Dispo: discharged to home

## 2018-05-05 NOTE — DISCHARGE NOTE ADULT - CARE PROVIDERS DIRECT ADDRESSES
,DirectAddress_Unknown [email or web address]

## 2018-05-05 NOTE — DISCHARGE NOTE ADULT - PLAN OF CARE
Pt will be free from sx- abdominal pain continue with low residue lactose free diet   Finish antibiotics cipro and flagyl   Follow up for outpatient colonoscopy when symptoms improved   Your brothers/sisters should have colonoscopies starting at age 40 - you should tell them to follow up with their own PMD Continue with Aspirin daily Continue with protonix Your blood pressure has been stable here no need for medication   Follow up with your PMD for monitoring Continue gabapentin as directed Continue with Aspirin daily  Continue metoprolol Your blood pressure has been stable here   Continue metoprolol   Follow up with your PMD for monitoring - CT A/P- showed colonic wall thickening involving the transverse, descending, sigmoid colon, rectum, consistent with a colitis  - continue with low residue lactose free diet   - Finish antibiotics Cipro and flagyl for 10 more days along with probiotics  - Follow up for outpatient colonoscopy when symptoms improved in 2 weeks with Dr Mendenhall GI  - Your brothers/sisters should have colonoscopies starting at age 40 - you should tell them to follow up with their own PMD - Continue with Aspirin daily  - Continue taking Metoprolol as prescribed - Continue with Omeprazole as prescribed - Your blood pressure has been stable in the hospital   - Continue taking Metoprolol as prescribed   Follow up with your PMD for monitoring - Continue taking Gabapentin as directed  - follow up with PCP

## 2018-05-05 NOTE — DISCHARGE NOTE ADULT - MEDICATION SUMMARY - MEDICATIONS TO TAKE
I will START or STAY ON the medications listed below when I get home from the hospital:    Flagyl 500 mg oral tablet  -- 1 tab(s) by mouth every 8 hours   -- Do not drink alcoholic beverages when taking this medication.  Finish all this medication unless otherwise directed by prescriber.  May discolor urine or feces.    -- Indication: For Colitis    acetaminophen 325 mg oral tablet  -- 2 tab(s) by mouth every 6 hours, As needed, Mild Pain (1 - 3)  -- Indication: For pain     aspirin 81 mg oral tablet  -- 1 tab(s) by mouth once a day  -- Indication: For Coronary artery disease involving native coronary artery of native heart without angina pectoris    gabapentin 600 mg oral tablet  -- 1 tab(s) by mouth 3 times a day  -- Indication: For Other chronic pain    metoprolol succinate 25 mg oral tablet, extended release  -- 1 tab(s) by mouth once a day  -- Indication: For HTN (hypertension)    omeprazole 40 mg oral delayed release capsule  -- 1 cap(s) by mouth once a day  -- Indication: For GERD (gastroesophageal reflux disease)    Cipro 500 mg oral tablet  -- 1 tab(s) by mouth 2 times a day x 10 days   -- Avoid prolonged or excessive exposure to direct and/or artificial sunlight while taking this medication.  Check with your doctor before becoming pregnant.  Do not take dairy products, antacids, or iron preparations within one hour of this medication.  Finish all this medication unless otherwise directed by prescriber.  Medication should be taken with plenty of water.    -- Indication: For Colitis I will START or STAY ON the medications listed below when I get home from the hospital:    Flagyl 500 mg oral tablet  -- 1 tab(s) by mouth every 8 hours   -- Do not drink alcoholic beverages when taking this medication.  Finish all this medication unless otherwise directed by prescriber.  May discolor urine or feces.    -- Indication: For Colitis    acetaminophen 325 mg oral tablet  -- 2 tab(s) by mouth every 6 hours, As needed, Mild Pain (1 - 3)  -- Indication: For pain     aspirin 81 mg oral tablet  -- 1 tab(s) by mouth once a day  -- Indication: For Coronary artery disease involving native coronary artery of native heart without angina pectoris    gabapentin 600 mg oral tablet  -- 1 tab(s) by mouth 3 times a day  -- Indication: For Other chronic pain    metoprolol succinate 25 mg oral tablet, extended release  -- 1 tab(s) by mouth once a day  -- Indication: For HTN (hypertension)    Acidophilus oral capsule  -- 1 cap(s) by mouth 2 times a day   -- Indication: For Need for prophylactic measure    omeprazole 40 mg oral delayed release capsule  -- 1 cap(s) by mouth once a day  -- Indication: For GERD (gastroesophageal reflux disease)    Cipro 500 mg oral tablet  -- 1 tab(s) by mouth 2 times a day x 10 days   -- Avoid prolonged or excessive exposure to direct and/or artificial sunlight while taking this medication.  Check with your doctor before becoming pregnant.  Do not take dairy products, antacids, or iron preparations within one hour of this medication.  Finish all this medication unless otherwise directed by prescriber.  Medication should be taken with plenty of water.    -- Indication: For Colitis

## 2018-05-05 NOTE — DISCHARGE NOTE ADULT - SECONDARY DIAGNOSIS.
Coronary artery disease involving native coronary artery of native heart without angina pectoris GERD (gastroesophageal reflux disease) HTN (hypertension) Sciatic nerve disease

## 2018-05-05 NOTE — DISCHARGE NOTE ADULT - CARE PROVIDER_API CALL
GI clinic at [facility],   Call for appointment for Follow up colonscopy  Phone: [phone]  Fax: [phone]   )    - Mer[facility] Mendenhall), Gastroenterology; Internal Medicine  [address] [address]  Phone: [phone]  Fax: [phone]

## 2018-05-05 NOTE — PROGRESS NOTE ADULT - ASSESSMENT
Impression:    1. Colitis: suspect due to bacterial infection, less likely colonic ischemia or UC. Clinically improved. H/H stable. Stool tests for infection pending.    2. Family history of CRC in a first degree relative older than age 60    Recommendation:  -advance to low residue lactose free diet  -if tolerates diet and stool frequency decreased today, may d/c to complete a total 14 days of cipro/flagyl  -outpatient colonoscopy when clinically improved
46 yo M with PMH of HTN, GERD a/w colitis likely 2/2 infectious etiology vs inflammatory etiology.      Problem/Plan - 1:  ·  Problem: Colitis.  Plan: -colonic wall thickening involving the transverse, descending, sigmoid colon, rectum, consistent with a colitis  -likely 2/2 infectious etiology  -check stool cx, O&P, ESR, CRP, GI PCR, C diff  -c/w cipro and flagyl  -monitor CBC  -symptomatic support PRN for pain  -diet per GI  -GI eval noted     Problem/Plan - 2:  ·  Problem: GERD (gastroesophageal reflux disease).  Plan: -c/w home omeprazole.      Problem/Plan - 3:  ·  Problem: HTN (hypertension).  Plan: -BP at goal  -c/w home metoprolol.      Problem/Plan - 4:  ·  Problem: Coronary artery disease involving native coronary artery of native heart without angina pectoris.  Plan: - c/w ASA and BB     Problem/Plan - 5:  ·  Problem: Other chronic pain.  Plan: - Patient states he is on gabapentin 2/2 chronic shoulder and lower back pain, will c/w for now.      Problem/Plan - 6:  Problem: Need for prophylactic measure. Plan: -IMPROVE score 0  -no need for chemical DVT ppx  -encourage early ambualtion.
46 yo Male with PMH of HTN, GERD a/w colitis likely 2/2 infectious etiology vs inflammatory etiology.       ·  Problem: Colitis.  Plan: -colonic wall thickening involving the transverse, descending, sigmoid colon, rectum, consistent with a colitis  -likely 2/2 infectious etiology  -c/w cipro and flagyl  -symptomatic support PRN for pain  -GI eval noted  - tolerating diet  d/c planning      ·  Problem: GERD (gastroesophageal reflux disease).  Plan: -c/w home omeprazole.       ·  Problem: HTN (hypertension).  Plan: -BP at goal  -c/w home metoprolol.       ·  Problem: Coronary artery disease involving native coronary artery of native heart without angina pectoris.  Plan: - c/w ASA and BB
48 yo M with PMH of HTN, GERD a/w colitis likely 2/2 infectious etiology vs inflammatory etiology.      Problem/Plan - 1:  ·  Problem: Colitis.  Plan: -colonic wall thickening involving the transverse, descending, sigmoid colon, rectum, consistent with a colitis  -likely 2/2 infectious etiology  -await stool cx, O&P, ESR, CRP, GI PCR, C diff  -c/w cipro and flagyl  -symptomatic support PRN for pain  -GI eval noted  - advance diet, possibly d/c planning if tolerating     Problem/Plan - 2:  ·  Problem: GERD (gastroesophageal reflux disease).  Plan: -c/w home omeprazole.      Problem/Plan - 3:  ·  Problem: HTN (hypertension).  Plan: -BP at goal  -c/w home metoprolol.      Problem/Plan - 4:  ·  Problem: Coronary artery disease involving native coronary artery of native heart without angina pectoris.  Plan: - c/w ASA and BB     Problem/Plan - 5:  ·  Problem: Other chronic pain.  Plan: - Patient states he is on gabapentin 2/2 chronic shoulder and lower back pain, will c/w for now.      Problem/Plan - 6:  Problem: Need for prophylactic measure. Plan: -IMPROVE score 0  -no need for chemical DVT ppx  -encourage early ambualtion.

## 2018-05-05 NOTE — DISCHARGE NOTE ADULT - PROVIDER TOKENS
FREE:[LAST:[GI clinic at [facility]],PHONE:[phone]],FAX:[(   [phone]    -],ADDRESS:[Call for appointment for Follow up colonscopy]] TOKEN:'[id number]'

## 2018-05-05 NOTE — DISCHARGE NOTE ADULT - CARE PLAN
Principal Discharge DX:	Colitis  Goal:	Pt will be free from sx- abdominal pain  Assessment and plan of treatment:	continue with low residue lactose free diet   Finish antibiotics cipro and flagyl   Follow up for outpatient colonoscopy when symptoms improved   Your brothers/sisters should have colonoscopies starting at age 40 - you should tell them to follow up with their own PMD  Secondary Diagnosis:	Coronary artery disease involving native coronary artery of native heart without angina pectoris  Assessment and plan of treatment:	Continue with Aspirin daily  Secondary Diagnosis:	GERD (gastroesophageal reflux disease)  Assessment and plan of treatment:	Continue with protonix  Secondary Diagnosis:	HTN (hypertension)  Assessment and plan of treatment:	Your blood pressure has been stable here no need for medication   Follow up with your PMD for monitoring  Secondary Diagnosis:	Sciatic nerve disease  Assessment and plan of treatment:	Continue gabapentin as directed Principal Discharge DX:	Colitis  Goal:	Pt will be free from sx- abdominal pain  Assessment and plan of treatment:	continue with low residue lactose free diet   Finish antibiotics cipro and flagyl   Follow up for outpatient colonoscopy when symptoms improved   Your brothers/sisters should have colonoscopies starting at age 40 - you should tell them to follow up with their own PMD  Secondary Diagnosis:	Coronary artery disease involving native coronary artery of native heart without angina pectoris  Assessment and plan of treatment:	Continue with Aspirin daily  Continue metoprolol  Secondary Diagnosis:	GERD (gastroesophageal reflux disease)  Assessment and plan of treatment:	Continue with protonix  Secondary Diagnosis:	HTN (hypertension)  Assessment and plan of treatment:	Your blood pressure has been stable here   Continue metoprolol   Follow up with your PMD for monitoring  Secondary Diagnosis:	Sciatic nerve disease  Assessment and plan of treatment:	Continue gabapentin as directed Principal Discharge DX:	Colitis  Goal:	Pt will be free from sx- abdominal pain  Assessment and plan of treatment:	- CT A/P- showed colonic wall thickening involving the transverse, descending, sigmoid colon, rectum, consistent with a colitis  - continue with low residue lactose free diet   - Finish antibiotics Cipro and flagyl for 10 more days along with probiotics  - Follow up for outpatient colonoscopy when symptoms improved in 2 weeks with Dr Mendenhall GI  - Your brothers/sisters should have colonoscopies starting at age 40 - you should tell them to follow up with their own PMD  Secondary Diagnosis:	Coronary artery disease involving native coronary artery of native heart without angina pectoris  Assessment and plan of treatment:	- Continue with Aspirin daily  - Continue taking Metoprolol as prescribed  Secondary Diagnosis:	GERD (gastroesophageal reflux disease)  Assessment and plan of treatment:	- Continue with Omeprazole as prescribed  Secondary Diagnosis:	HTN (hypertension)  Assessment and plan of treatment:	- Your blood pressure has been stable in the hospital   - Continue taking Metoprolol as prescribed   Follow up with your PMD for monitoring  Secondary Diagnosis:	Sciatic nerve disease  Assessment and plan of treatment:	- Continue taking Gabapentin as directed  - follow up with PCP

## 2018-05-06 LAB
GI PCR PANEL, STOOL: SIGNIFICANT CHANGE UP
SPECIMEN SOURCE: SIGNIFICANT CHANGE UP

## 2018-05-09 ENCOUNTER — RESULT REVIEW (OUTPATIENT)
Age: 48
End: 2018-05-09

## 2018-05-11 LAB
O+P SPEC CONC: SIGNIFICANT CHANGE UP
SPECIMEN SOURCE: SIGNIFICANT CHANGE UP
TRI STN SPEC: SIGNIFICANT CHANGE UP

## 2018-05-26 ENCOUNTER — RESULT REVIEW (OUTPATIENT)
Age: 48
End: 2018-05-26

## 2018-07-16 PROBLEM — I25.10 ATHEROSCLEROTIC HEART DISEASE OF NATIVE CORONARY ARTERY WITHOUT ANGINA PECTORIS: Chronic | Status: INACTIVE | Noted: 2017-06-05 | Resolved: 2018-05-02

## 2018-10-15 NOTE — DISCHARGE NOTE ADULT - ABILITY TO HEAR (WITH HEARING AID OR HEARING APPLIANCE IF NORMALLY USED):
Anesthesia Transfer of Care Note    Patient: Cristiana Pelayo    Procedure(s) Performed: Procedure(s) (LRB):  INSERTION, VENOUS ACCESS PORT (Left)    Patient location: PACU    Anesthesia Type: general    Transport from OR: Transported from OR on 2-3 L/min O2 by NC with adequate spontaneous ventilation    Post pain: adequate analgesia    Post assessment: no apparent anesthetic complications and tolerated procedure well    Post vital signs: stable    Level of consciousness: awake and alert    Nausea/Vomiting: no nausea/vomiting    Complications: none    Transfer of care protocol was followed      Last vitals:   Visit Vitals  BP (!) 162/73 (BP Location: Left arm, Patient Position: Lying)   Pulse (!) 48   Temp 36.6 °C (97.8 °F) (Skin)   Resp 14   SpO2 100%   Breastfeeding? No      Adequate: hears normal conversation without difficulty

## 2019-01-14 ENCOUNTER — EMERGENCY (EMERGENCY)
Facility: HOSPITAL | Age: 49
LOS: 1 days | Discharge: HOME | End: 2019-01-14
Admitting: EMERGENCY MEDICINE
Payer: MEDICAID

## 2019-01-14 VITALS
RESPIRATION RATE: 18 BRPM | SYSTOLIC BLOOD PRESSURE: 116 MMHG | OXYGEN SATURATION: 100 % | TEMPERATURE: 98 F | DIASTOLIC BLOOD PRESSURE: 76 MMHG | HEART RATE: 70 BPM

## 2019-01-14 PROBLEM — I25.10 ATHEROSCLEROTIC HEART DISEASE OF NATIVE CORONARY ARTERY WITHOUT ANGINA PECTORIS: Chronic | Status: ACTIVE | Noted: 2018-05-03

## 2019-01-14 PROCEDURE — 99283 EMERGENCY DEPT VISIT LOW MDM: CPT | Mod: 25

## 2019-01-14 RX ORDER — KETOROLAC TROMETHAMINE 30 MG/ML
15 SYRINGE (ML) INJECTION ONCE
Qty: 0 | Refills: 0 | Status: DISCONTINUED | OUTPATIENT
Start: 2019-01-14 | End: 2019-01-14

## 2019-01-14 RX ORDER — OXYCODONE AND ACETAMINOPHEN 5; 325 MG/1; MG/1
1 TABLET ORAL ONCE
Qty: 0 | Refills: 0 | Status: DISCONTINUED | OUTPATIENT
Start: 2019-01-14 | End: 2019-01-14

## 2019-01-14 RX ORDER — OMEPRAZOLE 10 MG/1
1 CAPSULE, DELAYED RELEASE ORAL
Qty: 10 | Refills: 0
Start: 2019-01-14 | End: 2019-01-23

## 2019-01-14 RX ORDER — IBUPROFEN 200 MG
1 TABLET ORAL
Qty: 20 | Refills: 0
Start: 2019-01-14 | End: 2019-01-18

## 2019-01-14 RX ADMIN — Medication 15 MILLIGRAM(S): at 01:19

## 2019-01-14 RX ADMIN — OXYCODONE AND ACETAMINOPHEN 1 TABLET(S): 5; 325 TABLET ORAL at 01:18

## 2019-01-14 NOTE — ED ADULT TRIAGE NOTE - CHIEF COMPLAINT QUOTE
c/o R shoulder/neck/upper back pain, started 3 days ago worsening today, denies N/V, CP, or SOB, + ROM in R arm, pain non-radiating. c/o R shoulder/neck/upper back pain, started 3 days ago worsening today, denies N/V, CP, or SOB, + ROM in R arm, pain non-radiating.  multiple visits for similar complaint.  Under care of physical therapist.

## 2019-01-14 NOTE — ED PROVIDER NOTE - NSFOLLOWUPINSTRUCTIONS_ED_ALL_ED_FT
Advance activity as tolerated.  Continue all previously prescribed medications as directed unless otherwise instructed.  Follow up with your primary care physician in 48-72 hours- bring copies of your results.  Return to the ER for worsening or persistent symptoms, and/or ANY NEW OR CONCERNING SYMPTOMS. If you have issues obtaining follow up, please call: [phone] to obtain a doctor or specialist who takes your insurance in your area.  You may call [phone] to make an appointment with the internal medicine clinic.

## 2019-01-14 NOTE — ED PROVIDER NOTE - UPPER EXTREMITY EXAM, RIGHT
anterior shoulder tenderness, pain with forward flexion/abduction of R arm. No sensory deficits, no deformity./LIMITED ROM

## 2019-01-18 NOTE — ED ADULT NURSE NOTE - ASSIGNED BED LOCATION
"Requested Prescriptions   Pending Prescriptions Disp Refills     FLUoxetine (PROZAC) 10 MG capsule [Pharmacy Med Name: FLUOXETINE 10MG CAPSULES]  Last Written Prescription Date:  10/1/18  Last Fill Quantity: 90,  # refills: 0   Last office visit: 12/6/2018 with prescribing provider:  Violet Fenton MD    Future Office Visit:     90 capsule 0     Sig: TAKE 1 CAPSULE(10 MG) BY MOUTH DAILY    SSRIs Protocol Passed - 1/17/2019 12:07 PM  PHQ-9 SCORE 8/3/2016 1/20/2017 3/2/2018   PHQ-9 Total Score 3 3 0     RYANNE-7 SCORE 8/3/2016 1/20/2017 3/2/2018   Total Score 2 1 0              Passed - Recent (12 mo) or future (30 days) visit within the authorizing provider's specialty    Patient had office visit in the last 12 months or has a visit in the next 30 days with authorizing provider or within the authorizing provider's specialty.  See \"Patient Info\" tab in inbasket, or \"Choose Columns\" in Meds & Orders section of the refill encounter.             Passed - Medication is active on med list       Passed - Patient is age 18 or older       Passed - No active pregnancy on record       Passed - No positive pregnancy test in last 12 months          "
Prescription approved per [facility] Refill Protocol.    Yajaira Soto RN    
[id number]

## 2019-07-14 ENCOUNTER — EMERGENCY (EMERGENCY)
Facility: HOSPITAL | Age: 49
LOS: 1 days | Discharge: HOME | End: 2019-07-14
Attending: EMERGENCY MEDICINE | Admitting: EMERGENCY MEDICINE
Payer: MEDICAID

## 2019-07-14 VITALS
RESPIRATION RATE: 18 BRPM | TEMPERATURE: 98 F | OXYGEN SATURATION: 99 % | DIASTOLIC BLOOD PRESSURE: 84 MMHG | SYSTOLIC BLOOD PRESSURE: 133 MMHG | HEART RATE: 76 BPM

## 2019-07-14 VITALS — RESPIRATION RATE: 16 BRPM | DIASTOLIC BLOOD PRESSURE: 94 MMHG | HEART RATE: 64 BPM | SYSTOLIC BLOOD PRESSURE: 124 MMHG

## 2019-07-14 LAB
APPEARANCE UR: CLEAR — SIGNIFICANT CHANGE UP
BILIRUB UR-MCNC: NEGATIVE — SIGNIFICANT CHANGE UP
BLOOD UR QL VISUAL: NEGATIVE — SIGNIFICANT CHANGE UP
COLOR SPEC: COLORLESS — SIGNIFICANT CHANGE UP
GLUCOSE UR-MCNC: NEGATIVE — SIGNIFICANT CHANGE UP
KETONES UR-MCNC: NEGATIVE — SIGNIFICANT CHANGE UP
LEUKOCYTE ESTERASE UR-ACNC: NEGATIVE — SIGNIFICANT CHANGE UP
NITRITE UR-MCNC: NEGATIVE — SIGNIFICANT CHANGE UP
PH UR: 7.5 — SIGNIFICANT CHANGE UP (ref 5–8)
PROT UR-MCNC: NEGATIVE — SIGNIFICANT CHANGE UP
SP GR SPEC: 1 — LOW (ref 1–1.04)
UROBILINOGEN FLD QL: NORMAL — SIGNIFICANT CHANGE UP

## 2019-07-14 PROCEDURE — 99284 EMERGENCY DEPT VISIT MOD MDM: CPT

## 2019-07-14 RX ORDER — CYCLOBENZAPRINE HYDROCHLORIDE 10 MG/1
1 TABLET, FILM COATED ORAL
Qty: 12 | Refills: 0
Start: 2019-07-14 | End: 2019-07-17

## 2019-07-14 RX ORDER — LIDOCAINE 4 G/100G
1 CREAM TOPICAL ONCE
Refills: 0 | Status: COMPLETED | OUTPATIENT
Start: 2019-07-14 | End: 2019-07-14

## 2019-07-14 RX ORDER — KETOROLAC TROMETHAMINE 30 MG/ML
30 SYRINGE (ML) INJECTION ONCE
Refills: 0 | Status: DISCONTINUED | OUTPATIENT
Start: 2019-07-14 | End: 2019-07-14

## 2019-07-14 RX ORDER — CYCLOBENZAPRINE HYDROCHLORIDE 10 MG/1
5 TABLET, FILM COATED ORAL ONCE
Refills: 0 | Status: COMPLETED | OUTPATIENT
Start: 2019-07-14 | End: 2019-07-14

## 2019-07-14 RX ORDER — IBUPROFEN 200 MG
1 TABLET ORAL
Qty: 12 | Refills: 0
Start: 2019-07-14 | End: 2019-07-17

## 2019-07-14 RX ADMIN — CYCLOBENZAPRINE HYDROCHLORIDE 5 MILLIGRAM(S): 10 TABLET, FILM COATED ORAL at 12:55

## 2019-07-14 RX ADMIN — Medication 30 MILLIGRAM(S): at 12:36

## 2019-07-14 RX ADMIN — LIDOCAINE 1 PATCH: 4 CREAM TOPICAL at 12:36

## 2019-07-14 NOTE — ED PROVIDER NOTE - CHPI ED SYMPTOMS NEG
Denies blood in urine, n/v/d, numbness, tingling, constipation, fatigue, or bladder dysfunction./no numbness/no tingling/no weakness

## 2019-07-14 NOTE — ED ADULT TRIAGE NOTE - CHIEF COMPLAINT QUOTE
Patient reports left flank pain radiating to the left leg x3 days. Patient reports dysuria, denies hematuria.

## 2019-07-14 NOTE — ED ADULT NURSE NOTE - OBJECTIVE STATEMENT
Pt c/o left flank pain radiating to the left leg x3 days with dysuria.  Pt denies hematuria.  Pt appears comfortable.  Medicated pt as ordered for pain.

## 2019-07-14 NOTE — ED PROVIDER NOTE - CLINICAL SUMMARY MEDICAL DECISION MAKING FREE TEXT BOX
47 y/o M with a PMHx of HTN presents to the ED c/o constant L side flank pain radiating to the back and side of the L leg.  Likely low back strain. Plan - Will check urine for  ureteral colic , give pain control, and reassess.. 49 y/o M with a PMHx of HTN presents to the ED c/o constant L side flank pain radiating to the back and side of the L leg.  Likely low back strain. Plan - Will check urine for   blood ( in case of ureteral colic ), give pain control, and reassess..

## 2019-07-14 NOTE — ED PROVIDER NOTE - NSFOLLOWUPINSTRUCTIONS_ED_ALL_ED_FT
Take Ibuprofen 600 mg with food every 8 hours as needed   Flexeril 5 mg every 8 hours as needed  for muscle relaxation- do not drive with this medication   Lidoderm patch( over the counter) apply to affected area  12 hours on , 12 hours off.   follow up with orthopedics- spine center  for continued pain.   Return to emergency for weakness, numbness worsened pain, difficulty urination or moving bowels, or new or concerning symptoms.

## 2019-07-14 NOTE — ED PROVIDER NOTE - OBJECTIVE STATEMENT
47 y/o M with a PMHx of HTN presents to the ED c/o constant L side flank pain radiating to the back and side of the L leg. Pt states he had back pain before, but nothing like this before. He denotes pain worsen when sitting down for long extended amount of time. Denies blood in urine, n/v/d, numbness, tingling, constipation, fatigue, or bladder dysfunction. No other acute complaints at time of eval. 49 y/o M with a PMHx of HTN presents to the ED c/o constant L side flank pain radiating to the back and side of the L leg. Pt states he had back pain before, but nothing like this before. He denotes pain worsen when sitting down for long extended amount of time. Of note, pt have been taking multiple medications secondary to his pervious colon infection and pain in various parts of his body. Denies blood in urine, n/v/d, numbness, tingling, constipation, fatigue, or bladder dysfunction. No other acute complaints at time of eval.

## 2019-07-14 NOTE — ED PROVIDER NOTE - PROGRESS NOTE DETAILS
DR Bloch- Patient feels better, ambulatory without difficulty. UA nml DR Bloch- Patient feels better, ambulatory without difficulty. UA nml discussed condition and discharge instruction with Óscar  - [id number] Patient understood and will f/u with ortho

## 2019-12-14 ENCOUNTER — EMERGENCY (EMERGENCY)
Facility: HOSPITAL | Age: 49
LOS: 1 days | Discharge: HOME | End: 2019-12-14
Attending: HOSPITALIST | Admitting: HOSPITALIST
Payer: MEDICAID

## 2019-12-14 VITALS
DIASTOLIC BLOOD PRESSURE: 78 MMHG | HEART RATE: 60 BPM | RESPIRATION RATE: 16 BRPM | TEMPERATURE: 98 F | SYSTOLIC BLOOD PRESSURE: 115 MMHG | OXYGEN SATURATION: 99 %

## 2019-12-14 LAB
ALBUMIN SERPL ELPH-MCNC: 4.4 G/DL — SIGNIFICANT CHANGE UP (ref 3.3–5)
ALP SERPL-CCNC: 47 U/L — SIGNIFICANT CHANGE UP (ref 40–120)
ALT FLD-CCNC: 11 U/L — SIGNIFICANT CHANGE UP (ref 4–41)
ANION GAP SERPL CALC-SCNC: 12 MMO/L — SIGNIFICANT CHANGE UP (ref 7–14)
APTT BLD: 28.5 SEC — SIGNIFICANT CHANGE UP (ref 27.5–36.3)
AST SERPL-CCNC: 13 U/L — SIGNIFICANT CHANGE UP (ref 4–40)
BASOPHILS # BLD AUTO: 0.03 K/UL — SIGNIFICANT CHANGE UP (ref 0–0.2)
BASOPHILS NFR BLD AUTO: 0.4 % — SIGNIFICANT CHANGE UP (ref 0–2)
BILIRUB SERPL-MCNC: 0.6 MG/DL — SIGNIFICANT CHANGE UP (ref 0.2–1.2)
BLD GP AB SCN SERPL QL: NEGATIVE — SIGNIFICANT CHANGE UP
BUN SERPL-MCNC: 13 MG/DL — SIGNIFICANT CHANGE UP (ref 7–23)
CALCIUM SERPL-MCNC: 9.6 MG/DL — SIGNIFICANT CHANGE UP (ref 8.4–10.5)
CHLORIDE SERPL-SCNC: 105 MMOL/L — SIGNIFICANT CHANGE UP (ref 98–107)
CO2 SERPL-SCNC: 23 MMOL/L — SIGNIFICANT CHANGE UP (ref 22–31)
CREAT SERPL-MCNC: 1.18 MG/DL — SIGNIFICANT CHANGE UP (ref 0.5–1.3)
EOSINOPHIL # BLD AUTO: 0.04 K/UL — SIGNIFICANT CHANGE UP (ref 0–0.5)
EOSINOPHIL NFR BLD AUTO: 0.5 % — SIGNIFICANT CHANGE UP (ref 0–6)
GLUCOSE SERPL-MCNC: 108 MG/DL — HIGH (ref 70–99)
HCT VFR BLD CALC: 45.3 % — SIGNIFICANT CHANGE UP (ref 39–50)
HGB BLD-MCNC: 14.7 G/DL — SIGNIFICANT CHANGE UP (ref 13–17)
IMM GRANULOCYTES NFR BLD AUTO: 0.3 % — SIGNIFICANT CHANGE UP (ref 0–1.5)
INR BLD: 1.13 — SIGNIFICANT CHANGE UP (ref 0.88–1.17)
LIDOCAIN IGE QN: 37.5 U/L — SIGNIFICANT CHANGE UP (ref 7–60)
LYMPHOCYTES # BLD AUTO: 1.95 K/UL — SIGNIFICANT CHANGE UP (ref 1–3.3)
LYMPHOCYTES # BLD AUTO: 26.1 % — SIGNIFICANT CHANGE UP (ref 13–44)
MAGNESIUM SERPL-MCNC: 2 MG/DL — SIGNIFICANT CHANGE UP (ref 1.6–2.6)
MCHC RBC-ENTMCNC: 27.4 PG — SIGNIFICANT CHANGE UP (ref 27–34)
MCHC RBC-ENTMCNC: 32.5 % — SIGNIFICANT CHANGE UP (ref 32–36)
MCV RBC AUTO: 84.4 FL — SIGNIFICANT CHANGE UP (ref 80–100)
MONOCYTES # BLD AUTO: 0.49 K/UL — SIGNIFICANT CHANGE UP (ref 0–0.9)
MONOCYTES NFR BLD AUTO: 6.6 % — SIGNIFICANT CHANGE UP (ref 2–14)
NEUTROPHILS # BLD AUTO: 4.94 K/UL — SIGNIFICANT CHANGE UP (ref 1.8–7.4)
NEUTROPHILS NFR BLD AUTO: 66.1 % — SIGNIFICANT CHANGE UP (ref 43–77)
NRBC # FLD: 0 K/UL — SIGNIFICANT CHANGE UP (ref 0–0)
OB PNL STL: NEGATIVE — SIGNIFICANT CHANGE UP
PHOSPHATE SERPL-MCNC: 3.4 MG/DL — SIGNIFICANT CHANGE UP (ref 2.5–4.5)
PLATELET # BLD AUTO: 233 K/UL — SIGNIFICANT CHANGE UP (ref 150–400)
PMV BLD: 10.9 FL — SIGNIFICANT CHANGE UP (ref 7–13)
POTASSIUM SERPL-MCNC: 4.5 MMOL/L — SIGNIFICANT CHANGE UP (ref 3.5–5.3)
POTASSIUM SERPL-SCNC: 4.5 MMOL/L — SIGNIFICANT CHANGE UP (ref 3.5–5.3)
PROT SERPL-MCNC: 6.9 G/DL — SIGNIFICANT CHANGE UP (ref 6–8.3)
PROTHROM AB SERPL-ACNC: 12.6 SEC — SIGNIFICANT CHANGE UP (ref 9.8–13.1)
RBC # BLD: 5.37 M/UL — SIGNIFICANT CHANGE UP (ref 4.2–5.8)
RBC # FLD: 13 % — SIGNIFICANT CHANGE UP (ref 10.3–14.5)
RH IG SCN BLD-IMP: POSITIVE — SIGNIFICANT CHANGE UP
SODIUM SERPL-SCNC: 140 MMOL/L — SIGNIFICANT CHANGE UP (ref 135–145)
WBC # BLD: 7.47 K/UL — SIGNIFICANT CHANGE UP (ref 3.8–10.5)
WBC # FLD AUTO: 7.47 K/UL — SIGNIFICANT CHANGE UP (ref 3.8–10.5)

## 2019-12-14 PROCEDURE — 74177 CT ABD & PELVIS W/CONTRAST: CPT | Mod: 26

## 2019-12-14 PROCEDURE — 99284 EMERGENCY DEPT VISIT MOD MDM: CPT

## 2019-12-14 RX ORDER — ACETAMINOPHEN 500 MG
650 TABLET ORAL ONCE
Refills: 0 | Status: COMPLETED | OUTPATIENT
Start: 2019-12-14 | End: 2019-12-14

## 2019-12-14 RX ADMIN — Medication 650 MILLIGRAM(S): at 23:14

## 2019-12-14 RX ADMIN — Medication 650 MILLIGRAM(S): at 22:29

## 2019-12-14 NOTE — ED ADULT TRIAGE NOTE - CHIEF COMPLAINT QUOTE
pt c/o BRBPR x4 episodes today accompanied by abdominal pain. states he was admitted for similar symptoms ~1 yr ago. PMH HTN.

## 2019-12-14 NOTE — ED PROVIDER NOTE - NS ED ROS FT
CONSTITUTIONAL: +Fevers  Cardiovascular: No Chest pain  Respiratory: No SOB  Gastrointestinal: refer to HPI  Genitourinary: no dysuria, no hematuria  SKIN: no rashes.  NEURO: no headache, no weakness or numbness  PSYCHIATRIC: no known mental health issues.

## 2019-12-14 NOTE — ED PROVIDER NOTE - PATIENT PORTAL LINK FT
You can access the FollowMyHealth Patient Portal offered by [facility] by registering at the following website: [email or web address]. By joining [employer]’s FollowMyHealth portal, you will also be able to view your health information using other applications (apps) compatible with our system.

## 2019-12-14 NOTE — ED ADULT NURSE NOTE - NSIMPLEMENTINTERV_GEN_ALL_ED
Implemented All Universal Safety Interventions:  [place] to call system. Call bell, personal items and telephone within reach. Instruct patient to call for assistance. Room bathroom lighting operational. Non-slip footwear when patient is off stretcher. Physically safe environment: no spills, clutter or unnecessary equipment. Stretcher in lowest position, wheels locked, appropriate side rails in place.

## 2019-12-14 NOTE — ED ADULT NURSE NOTE - OBJECTIVE STATEMENT
50 y/o M received to room 27 c/o bright red blood in stools.  Pt a&ox4 and ambulatory. Pt states he has been having bright red blood in the stool that started today.  Pt states he had 6 bowel movements. Abdomen soft nondistended and tender in all 4 quadrants. bowel sounds present in all 4 quadrants. pt respirations even and unlabored with lung sounds clear bilaterally.  Skin intact and color consistent with ethnicity. Pt denies n/v, straining with bowel movements, fevers, chills, cough CP, SOB, dizziness, lightheadedness, or HA. Vital signs as noted, call bell in reach, comfort measures provided, MD evaluated, 20G IV placed L AC, labs drawn and sent.  Will continue to monitor.

## 2019-12-14 NOTE — ED PROVIDER NOTE - ATTENDING CONTRIBUTION TO CARE
49M with hx of HTN, HLD, CAD with stents p/w BRBPR x1 day. patient states he has had 6 painless episodes of BRBPR today with mild diffuse abdominal pain. states he had similar sx last year and was dx with colitis. (+) subjective fever. no vomiting, no blood thinners.     ***GEN - NAD; well appearing; A+O x3 ***HEAD - NC/AT ***EYES/NOSE - PEERL, EOMI, mucous membranes moist, no discharge ***THROAT: Oral cavity and pharynx normal. No inflammation, swelling, exudate, or lesions.  ***NECK: Neck supple  ***PULMONARY - CTA b/l, symmetric breath sounds. ***CARDIAC -s1s2, RRR, no M,G,R  ***ABDOMEN - +BS, ND, mildly ttp diffusely, soft, no guarding, no rebound, no masses   ***BACK - no CVA tenderness, Normal  spine ***EXTREMITIES - symmetric pulses, 2+ dp, capillary refill < 2 seconds, no clubbing, no cyanosis, no edema ***SKIN - no rash or bruising   ***NEUROLOGIC - alert, CN 2-12 intact, gait nl, ***PSYCH - insight and judgment nl, memory nl, affect nl, thought nl      MDM: 49M with LGI Bleed and abdominal pain. hemodynamically stable. labs, ct a/p.

## 2019-12-14 NOTE — ED PROVIDER NOTE - NSFOLLOWUPCLINICS_GEN_ALL_ED_FT
[facility]  Gastroenterology  [address] [place] [address] [address]  [place] [address]  Phone: [phone]  Fax:   Follow Up Time: Urgent

## 2019-12-14 NOTE — ED PROVIDER NOTE - CLINICAL SUMMARY MEDICAL DECISION MAKING FREE TEXT BOX
Luis PGY-2:  48yo M here with BRBPR and diffuse abd pain along with fevers found to have rectal tenderness, concern for collitis vs less likely appendicitis will get CT a/p, labs, pain control and reassess

## 2019-12-14 NOTE — ED PROVIDER NOTE - PROGRESS NOTE DETAILS
Luis PGY-2:  I have given the pt strict return and follow up precautions. The patient has been provided with a copy of all pertinent results. The patient has been informed of all concerning signs and symptoms to return to Emergency Department, the necessity to follow up with PMD/Clinic/follow up provided within 2-3 days was explained, and the patient reports understanding of above with capacity and insight.

## 2019-12-14 NOTE — ED PROVIDER NOTE - OBJECTIVE STATEMENT
50yo M pmhx of HLD, HTN, s/p cardiac stents pw cc of BRBPR    2 days of fever, feeling weaker, BRBPR started today x 6 episodes along with diffuse abd pain . No travel outside the [place] this year.  Same sx last year dx with collitis at this time. No pain with urination, no increased frequency, no odd smell. No cough.     PCP: MD Boudreaux @ 71 [facility]     No hx of any surgeries  Meds: Metoprolol, ASA, Fish oil, senna  NDKA

## 2019-12-14 NOTE — ED PROVIDER NOTE - NSFOLLOWUPINSTRUCTIONS_ED_ALL_ED_FT
(1) Follow up with your primary care physician as discussed. In addition, we did not find evidence of a life threatening illness on your testing here today, but listed below are the specialists that will be necessary to see as an outpatient to continue the workup.  Please call the numbers listed below or [phone] to set up the necessary appointments.  (2) Immediately seek care at your nearest emergency room if your worsen, persist, or do not resolve   (3) Take Tylenol (up to 1000mg or 1 g)  and/or Motrin (up to 600mg) up to every 6 hours as needed for pain.

## 2019-12-14 NOTE — ED PROVIDER NOTE - PHYSICAL EXAMINATION
General: well appearing, interactive, well nourished, NAD  HEENT: pupils equal and reactive, normal oropharynx, normal external ears bilaterally   Cardiac: RRR, no MRG appreciated  Resp: lungs clear to auscultation bilaterally, symmetric chest wall rise  Abd: soft, diffusely ttp, normoactive bowel sounds  : no CVA tenderness  Neuro: Moving all extremities  Skin:  normal color for race  rectal exam performed with chaperone SUKUMAR Ahmadi: brown stool, rectal tenderness noted during exam

## 2019-12-15 VITALS
RESPIRATION RATE: 16 BRPM | OXYGEN SATURATION: 97 % | TEMPERATURE: 97 F | HEART RATE: 57 BPM | SYSTOLIC BLOOD PRESSURE: 111 MMHG | DIASTOLIC BLOOD PRESSURE: 77 MMHG

## 2019-12-15 LAB
APPEARANCE UR: CLEAR — SIGNIFICANT CHANGE UP
BACTERIA # UR AUTO: NEGATIVE — SIGNIFICANT CHANGE UP
BILIRUB UR-MCNC: NEGATIVE — SIGNIFICANT CHANGE UP
BLOOD UR QL VISUAL: NEGATIVE — SIGNIFICANT CHANGE UP
COLOR SPEC: YELLOW — SIGNIFICANT CHANGE UP
GLUCOSE UR-MCNC: NEGATIVE — SIGNIFICANT CHANGE UP
HYALINE CASTS # UR AUTO: NEGATIVE — SIGNIFICANT CHANGE UP
KETONES UR-MCNC: NEGATIVE — SIGNIFICANT CHANGE UP
LEUKOCYTE ESTERASE UR-ACNC: NEGATIVE — SIGNIFICANT CHANGE UP
NITRITE UR-MCNC: NEGATIVE — SIGNIFICANT CHANGE UP
PH UR: 6.5 — SIGNIFICANT CHANGE UP (ref 5–8)
PROT UR-MCNC: 30 — SIGNIFICANT CHANGE UP
RBC CASTS # UR COMP ASSIST: SIGNIFICANT CHANGE UP (ref 0–?)
SP GR SPEC: > 1.04 — HIGH (ref 1–1.04)
SQUAMOUS # UR AUTO: SIGNIFICANT CHANGE UP
UROBILINOGEN FLD QL: NORMAL — SIGNIFICANT CHANGE UP
WBC UR QL: SIGNIFICANT CHANGE UP (ref 0–?)

## 2019-12-16 LAB
C TRACH RRNA SPEC QL NAA+PROBE: SIGNIFICANT CHANGE UP
N GONORRHOEA RRNA SPEC QL NAA+PROBE: SIGNIFICANT CHANGE UP
SPECIMEN SOURCE: SIGNIFICANT CHANGE UP

## 2020-03-12 ENCOUNTER — EMERGENCY (EMERGENCY)
Facility: HOSPITAL | Age: 50
LOS: 1 days | Discharge: HOME | End: 2020-03-12
Admitting: EMERGENCY MEDICINE
Payer: MEDICAID

## 2020-03-12 VITALS
TEMPERATURE: 101 F | SYSTOLIC BLOOD PRESSURE: 118 MMHG | HEART RATE: 90 BPM | RESPIRATION RATE: 18 BRPM | OXYGEN SATURATION: 96 % | DIASTOLIC BLOOD PRESSURE: 83 MMHG

## 2020-03-12 VITALS
TEMPERATURE: 101 F | DIASTOLIC BLOOD PRESSURE: 78 MMHG | HEART RATE: 92 BPM | OXYGEN SATURATION: 100 % | SYSTOLIC BLOOD PRESSURE: 120 MMHG | RESPIRATION RATE: 20 BRPM

## 2020-03-12 LAB

## 2020-03-12 PROCEDURE — 99283 EMERGENCY DEPT VISIT LOW MDM: CPT

## 2020-03-12 RX ORDER — IBUPROFEN 200 MG
600 TABLET ORAL ONCE
Refills: 0 | Status: COMPLETED | OUTPATIENT
Start: 2020-03-12 | End: 2020-03-12

## 2020-03-12 RX ADMIN — Medication 600 MILLIGRAM(S): at 20:54

## 2020-03-12 NOTE — ED PROVIDER NOTE - OBJECTIVE STATEMENT
48yo male with no sig pmh presents with dull frontal headache associated with nasal congestion and subjective fever/chills since last night. States that he has not taken his temperature at home but he has chills as If he has a fever. Reports that headache began gradually and describes it as a 6/10 dull frontal headache. Denies provoking/alleviating factors. Took tylenol with little relief. Denies recent travel and sick contacts but reports he is a  with route throughout [facility] including the airports. Denies cough, chest pain, KRAMER, SOB, palpitations, visual changes, nausea/vomiting, photosensitivity, and other associated sx.

## 2020-03-12 NOTE — ED PROVIDER NOTE - PATIENT PORTAL LINK FT
You can access the FollowMyHealth Patient Portal offered by [facility] by registering at the following website: [email or web address]. By joining [employer]s FollowMyHealth portal, you will also be able to view your health information using other applications (apps) compatible with our system.

## 2020-03-12 NOTE — ED PROVIDER NOTE - NSFOLLOWUPINSTRUCTIONS_ED_ALL_ED_FT
Take ibuprofen 600mg every 6 hours as needed for headache and fever.  -- Please avoid contact with others while you are symptomatic and ideally two weeks.  -- This is likely a virus.  There is no direct treatment for a virus, and antibiotics are not effective.     -- Rest, increase your fluid intake and avoid dehydration & alcohol.   -- It is very important to be diligent about hand washing & hygiene to avoid spreading the illness to others.  -- If you are having any worsening or continued fever, chills, weakness, nausea, vomiting, worsening shortness of breath, please see your doctor or return to the Emergency Department.  -- Please continue taking your home medications as directed.     Why didn’t I get tested for novel coronavirus (COVID-19)?    The number of available tests is very limited so strict rules exist for who is allowed to be tested. [facility] has been authorized to perform testing and is currently working hard to be able to start providing the test. Such testing is currently reserved for patients who have had contact with someone infected with the virus, or those who are very sick a plus those who have traveled to areas identified by the Centers for Disease Control and Prevention (CD) and will require hospitalization.     What should I do now?    If you are well enough to be discharged home and are not in a high risk group to have contracted the COVID-19, you should care for yourself at home exactly like you would if you have Influenza “flu”. Follow all the standard guidelines about washing your hands, covering your cough, etc. You should return to the Emergency Department if you develop worse symptoms, trouble breathing, chest pain, and/or a fever that doesn’t improve with over the counter acetaminophen or ibuprofen.

## 2020-03-12 NOTE — ED PROVIDER NOTE - PROGRESS NOTE DETAILS
TIM Ball: Patient now asking for Rvp. Will do RVP Supervising Statement (LEXI DUMONT): I have personally seen and examined this patient.  I have fully participated in the care of this patient. I have reviewed all pertinent clinical information, including history, physical exam, plan and the ACP Fellow's note and agree except as noted.

## 2020-03-12 NOTE — ED PROVIDER NOTE - CLINICAL SUMMARY MEDICAL DECISION MAKING FREE TEXT BOX
50yo male with pmh of htn as per chart presents with frontal dull headache associated with fever and nasal congestion since yesterday. Febrile here. Lungs clear b/l. , possible COVID. Treat and release as protocol. Refused RVP. Will give ibuprofen for headache and fever and put patient in [facility] system. Discussed with patient that he should stay home 14 days and gave him strict return protocol.

## 2020-03-12 NOTE — ED ADULT NURSE NOTE - OBJECTIVE STATEMENT
flu like symptoms and fever for the past few days; no history of travel and stated has not been around anyone who has traveled

## 2020-03-12 NOTE — ED ADULT NURSE NOTE - PRIMARY CARE PROVIDER
Transfer Note - ICU Transfer to SD/MS tele   Kirsten Agustin 62 y o  male MRN: [id number]  [address] [place] [address]   Unit/Bed#: [facility] 583-45 Encounter: [id number]    Milly Ogden regarding transfer  Please call  with any questions or concerns       Mellissa Rendon PA-C unknown

## 2020-03-13 RX ORDER — IBUPROFEN 200 MG
1 TABLET ORAL
Qty: 16 | Refills: 0
Start: 2020-03-13 | End: 2020-03-16

## 2020-07-14 NOTE — PATIENT PROFILE ADULT. - SOCIAL CONCERNS
[de-identified] : patient consents to telehealth from work\par MD at [address] [place], [place]\par \par dictation inactive\par \par 38 yr male, CD colon, flare confirmed by colonoscopy June 2020\par Improved clinically, normal BM on Uceris 9mg daily\par Intolerant to repeated trials of meslamine, colazal, diarrhea...DC, feeling better\par \par 
None

## 2022-04-05 ENCOUNTER — INPATIENT (INPATIENT)
Facility: HOSPITAL | Age: 52
LOS: 0 days | Discharge: HOME | End: 2022-04-06
Attending: INTERNAL MEDICINE | Admitting: INTERNAL MEDICINE
Payer: MEDICAID

## 2022-04-05 VITALS
TEMPERATURE: 98 F | HEIGHT: 68 IN | DIASTOLIC BLOOD PRESSURE: 71 MMHG | SYSTOLIC BLOOD PRESSURE: 106 MMHG | HEART RATE: 60 BPM | RESPIRATION RATE: 18 BRPM | OXYGEN SATURATION: 100 %

## 2022-04-05 DIAGNOSIS — K21.9 GASTRO-ESOPHAGEAL REFLUX DISEASE WITHOUT ESOPHAGITIS: ICD-10-CM

## 2022-04-05 DIAGNOSIS — R07.9 CHEST PAIN, UNSPECIFIED: ICD-10-CM

## 2022-04-05 DIAGNOSIS — I25.10 ATHEROSCLEROTIC HEART DISEASE OF NATIVE CORONARY ARTERY WITHOUT ANGINA PECTORIS: ICD-10-CM

## 2022-04-05 DIAGNOSIS — I10 ESSENTIAL (PRIMARY) HYPERTENSION: ICD-10-CM

## 2022-04-05 LAB
ALBUMIN SERPL ELPH-MCNC: 4.5 G/DL — SIGNIFICANT CHANGE UP (ref 3.3–5)
ALP SERPL-CCNC: 58 U/L — SIGNIFICANT CHANGE UP (ref 40–120)
ALT FLD-CCNC: 13 U/L — SIGNIFICANT CHANGE UP (ref 4–41)
ANION GAP SERPL CALC-SCNC: 12 MMOL/L — SIGNIFICANT CHANGE UP (ref 7–14)
AST SERPL-CCNC: 18 U/L — SIGNIFICANT CHANGE UP (ref 4–40)
BASE EXCESS BLDV CALC-SCNC: -0.9 MMOL/L — SIGNIFICANT CHANGE UP (ref -2–3)
BASOPHILS # BLD AUTO: 0.04 K/UL — SIGNIFICANT CHANGE UP (ref 0–0.2)
BASOPHILS NFR BLD AUTO: 0.7 % — SIGNIFICANT CHANGE UP (ref 0–2)
BILIRUB SERPL-MCNC: 0.6 MG/DL — SIGNIFICANT CHANGE UP (ref 0.2–1.2)
BLOOD GAS VENOUS COMPREHENSIVE RESULT: SIGNIFICANT CHANGE UP
BUN SERPL-MCNC: 10 MG/DL — SIGNIFICANT CHANGE UP (ref 7–23)
CALCIUM SERPL-MCNC: 10 MG/DL — SIGNIFICANT CHANGE UP (ref 8.4–10.5)
CHLORIDE BLDV-SCNC: 103 MMOL/L — SIGNIFICANT CHANGE UP (ref 96–108)
CHLORIDE SERPL-SCNC: 104 MMOL/L — SIGNIFICANT CHANGE UP (ref 98–107)
CO2 BLDV-SCNC: 26.8 MMOL/L — HIGH (ref 22–26)
CO2 SERPL-SCNC: 21 MMOL/L — LOW (ref 22–31)
CREAT SERPL-MCNC: 1.06 MG/DL — SIGNIFICANT CHANGE UP (ref 0.5–1.3)
EGFR: 86 ML/MIN/1.73M2 — SIGNIFICANT CHANGE UP
EOSINOPHIL # BLD AUTO: 0.05 K/UL — SIGNIFICANT CHANGE UP (ref 0–0.5)
EOSINOPHIL NFR BLD AUTO: 0.9 % — SIGNIFICANT CHANGE UP (ref 0–6)
GAS PNL BLDV: 135 MMOL/L — LOW (ref 136–145)
GLUCOSE BLDV-MCNC: 93 MG/DL — SIGNIFICANT CHANGE UP (ref 70–99)
GLUCOSE SERPL-MCNC: 99 MG/DL — SIGNIFICANT CHANGE UP (ref 70–99)
HCO3 BLDV-SCNC: 25 MMOL/L — SIGNIFICANT CHANGE UP (ref 22–29)
HCT VFR BLD CALC: 46.3 % — SIGNIFICANT CHANGE UP (ref 39–50)
HCT VFR BLDA CALC: 46 % — SIGNIFICANT CHANGE UP (ref 39–51)
HGB BLD CALC-MCNC: 15.3 G/DL — SIGNIFICANT CHANGE UP (ref 13–17)
HGB BLD-MCNC: 15.4 G/DL — SIGNIFICANT CHANGE UP (ref 13–17)
IANC: 3.07 K/UL — SIGNIFICANT CHANGE UP (ref 1.8–7.4)
IMM GRANULOCYTES NFR BLD AUTO: 0.3 % — SIGNIFICANT CHANGE UP (ref 0–1.5)
LACTATE BLDV-MCNC: 1.3 MMOL/L — SIGNIFICANT CHANGE UP (ref 0.5–2)
LYMPHOCYTES # BLD AUTO: 2.17 K/UL — SIGNIFICANT CHANGE UP (ref 1–3.3)
LYMPHOCYTES # BLD AUTO: 37.5 % — SIGNIFICANT CHANGE UP (ref 13–44)
MCHC RBC-ENTMCNC: 27.9 PG — SIGNIFICANT CHANGE UP (ref 27–34)
MCHC RBC-ENTMCNC: 33.3 GM/DL — SIGNIFICANT CHANGE UP (ref 32–36)
MCV RBC AUTO: 83.9 FL — SIGNIFICANT CHANGE UP (ref 80–100)
MONOCYTES # BLD AUTO: 0.44 K/UL — SIGNIFICANT CHANGE UP (ref 0–0.9)
MONOCYTES NFR BLD AUTO: 7.6 % — SIGNIFICANT CHANGE UP (ref 2–14)
NEUTROPHILS # BLD AUTO: 3.07 K/UL — SIGNIFICANT CHANGE UP (ref 1.8–7.4)
NEUTROPHILS NFR BLD AUTO: 53 % — SIGNIFICANT CHANGE UP (ref 43–77)
NRBC # BLD: 0 /100 WBCS — SIGNIFICANT CHANGE UP
NRBC # FLD: 0 K/UL — SIGNIFICANT CHANGE UP
PCO2 BLDV: 47 MMHG — SIGNIFICANT CHANGE UP (ref 42–55)
PH BLDV: 7.34 — SIGNIFICANT CHANGE UP (ref 7.32–7.43)
PLATELET # BLD AUTO: 232 K/UL — SIGNIFICANT CHANGE UP (ref 150–400)
PO2 BLDV: 70 MMHG — SIGNIFICANT CHANGE UP
POTASSIUM BLDV-SCNC: 4.1 MMOL/L — SIGNIFICANT CHANGE UP (ref 3.5–5.1)
POTASSIUM SERPL-MCNC: 4.6 MMOL/L — SIGNIFICANT CHANGE UP (ref 3.5–5.3)
POTASSIUM SERPL-SCNC: 4.6 MMOL/L — SIGNIFICANT CHANGE UP (ref 3.5–5.3)
PROT SERPL-MCNC: 6.8 G/DL — SIGNIFICANT CHANGE UP (ref 6–8.3)
RBC # BLD: 5.52 M/UL — SIGNIFICANT CHANGE UP (ref 4.2–5.8)
RBC # FLD: 12.5 % — SIGNIFICANT CHANGE UP (ref 10.3–14.5)
SAO2 % BLDV: 93.2 % — SIGNIFICANT CHANGE UP
SARS-COV-2 RNA SPEC QL NAA+PROBE: SIGNIFICANT CHANGE UP
SODIUM SERPL-SCNC: 137 MMOL/L — SIGNIFICANT CHANGE UP (ref 135–145)
TROPONIN T, HIGH SENSITIVITY RESULT: 7 NG/L — SIGNIFICANT CHANGE UP
TROPONIN T, HIGH SENSITIVITY RESULT: 7 NG/L — SIGNIFICANT CHANGE UP
WBC # BLD: 5.79 K/UL — SIGNIFICANT CHANGE UP (ref 3.8–10.5)
WBC # FLD AUTO: 5.79 K/UL — SIGNIFICANT CHANGE UP (ref 3.8–10.5)

## 2022-04-05 PROCEDURE — 99223 1ST HOSP IP/OBS HIGH 75: CPT

## 2022-04-05 PROCEDURE — 93010 ELECTROCARDIOGRAM REPORT: CPT

## 2022-04-05 PROCEDURE — 71046 X-RAY EXAM CHEST 2 VIEWS: CPT | Mod: 26

## 2022-04-05 PROCEDURE — 99222 1ST HOSP IP/OBS MODERATE 55: CPT

## 2022-04-05 PROCEDURE — 99285 EMERGENCY DEPT VISIT HI MDM: CPT | Mod: 25

## 2022-04-05 RX ORDER — IBUPROFEN 200 MG
1 TABLET ORAL
Qty: 0 | Refills: 0 | DISCHARGE

## 2022-04-05 RX ORDER — GABAPENTIN 400 MG/1
1 CAPSULE ORAL
Qty: 0 | Refills: 0 | DISCHARGE

## 2022-04-05 RX ORDER — OMEGA-3 ACID ETHYL ESTERS 1 G
2 CAPSULE ORAL
Qty: 0 | Refills: 0 | DISCHARGE

## 2022-04-05 RX ORDER — ASPIRIN/CALCIUM CARB/MAGNESIUM 324 MG
1 TABLET ORAL
Qty: 0 | Refills: 0 | DISCHARGE

## 2022-04-05 RX ORDER — ONDANSETRON 8 MG/1
4 TABLET, FILM COATED ORAL EVERY 8 HOURS
Refills: 0 | Status: DISCONTINUED | OUTPATIENT
Start: 2022-04-05 | End: 2022-04-06

## 2022-04-05 RX ORDER — ACETAMINOPHEN 500 MG
650 TABLET ORAL EVERY 6 HOURS
Refills: 0 | Status: DISCONTINUED | OUTPATIENT
Start: 2022-04-05 | End: 2022-04-06

## 2022-04-05 RX ORDER — PANTOPRAZOLE SODIUM 20 MG/1
40 TABLET, DELAYED RELEASE ORAL
Refills: 0 | Status: DISCONTINUED | OUTPATIENT
Start: 2022-04-05 | End: 2022-04-06

## 2022-04-05 RX ORDER — MULTIVIT-MIN/FERROUS GLUCONATE 9 MG/15 ML
1 LIQUID (ML) ORAL
Qty: 0 | Refills: 0 | DISCHARGE

## 2022-04-05 RX ORDER — OMEPRAZOLE 10 MG/1
1 CAPSULE, DELAYED RELEASE ORAL
Qty: 0 | Refills: 0 | DISCHARGE

## 2022-04-05 RX ORDER — OMEGA-3 ACID ETHYL ESTERS 1 G
2 CAPSULE ORAL
Refills: 0 | Status: DISCONTINUED | OUTPATIENT
Start: 2022-04-05 | End: 2022-04-06

## 2022-04-05 RX ORDER — METOPROLOL TARTRATE 50 MG
1 TABLET ORAL
Qty: 0 | Refills: 0 | DISCHARGE

## 2022-04-05 RX ORDER — LANOLIN ALCOHOL/MO/W.PET/CERES
3 CREAM (GRAM) TOPICAL AT BEDTIME
Refills: 0 | Status: DISCONTINUED | OUTPATIENT
Start: 2022-04-05 | End: 2022-04-06

## 2022-04-05 RX ORDER — ASPIRIN/CALCIUM CARB/MAGNESIUM 324 MG
162 TABLET ORAL ONCE
Refills: 0 | Status: COMPLETED | OUTPATIENT
Start: 2022-04-05 | End: 2022-04-05

## 2022-04-05 RX ORDER — ATORVASTATIN CALCIUM 80 MG/1
80 TABLET, FILM COATED ORAL AT BEDTIME
Refills: 0 | Status: DISCONTINUED | OUTPATIENT
Start: 2022-04-05 | End: 2022-04-06

## 2022-04-05 RX ORDER — METOPROLOL TARTRATE 50 MG
25 TABLET ORAL DAILY
Refills: 0 | Status: DISCONTINUED | OUTPATIENT
Start: 2022-04-05 | End: 2022-04-06

## 2022-04-05 RX ORDER — ASPIRIN/CALCIUM CARB/MAGNESIUM 324 MG
81 TABLET ORAL DAILY
Refills: 0 | Status: DISCONTINUED | OUTPATIENT
Start: 2022-04-06 | End: 2022-04-06

## 2022-04-05 RX ORDER — GABAPENTIN 400 MG/1
600 CAPSULE ORAL THREE TIMES A DAY
Refills: 0 | Status: DISCONTINUED | OUTPATIENT
Start: 2022-04-05 | End: 2022-04-06

## 2022-04-05 RX ADMIN — GABAPENTIN 600 MILLIGRAM(S): 400 CAPSULE ORAL at 21:56

## 2022-04-05 RX ADMIN — Medication 1 TABLET(S): at 17:29

## 2022-04-05 RX ADMIN — ATORVASTATIN CALCIUM 80 MILLIGRAM(S): 80 TABLET, FILM COATED ORAL at 21:58

## 2022-04-05 RX ADMIN — Medication 162 MILLIGRAM(S): at 11:28

## 2022-04-05 RX ADMIN — Medication 5 MILLIGRAM(S): at 21:57

## 2022-04-05 NOTE — H&P ADULT - HISTORY OF PRESENT ILLNESS
48 yo M with PMH of CAD with ?PCI (pt unsure, previous records state he had 3 stents), HTN, GERD presents for abdominal pain for one day. Pt reports one hour after eating his breakfast, he started to have acute onset of sharp crampy nonradiating abdominal pain that was 8/10. Pt went to bathroom and had a BM soft stool with bright red blood. Pt has had 13 soft BM today with blood. No associated f/c, n/v. ROS is notable for weakness. He reports able to tolerate soup today. No hx of similar abdominal pain or bleeding in the past. He endorses being on daily ASA with no other blood thinning medication or herbal supplement. No CP, palpitation, sick contact, recent traveling, urinary symptoms.     In ED, T 98.4, HR 69, /82, RR 16, Sat 98 RA. CT a/p noted diffuse colonic wall thickening involving the transverse, descending, sigmoid colon, and rectum indicative of colitis. Pt received cipro, flagyl, NS x1L, morphine 4mg x1.    51M hx of CAD with ?PCI (pt unsure, previous records state he had 3 stents), HTN, GERD, HLD presents with a cc of L sided chest pain 3 days non radiating a/w SOB KRAMER worsening prompting visit also a/w nausea, no vomiting. Last saw cardiologist x1 year ago, cannot recall name. Reports has had prior admissions for chest pain. No fever, no new LE swelling. Denies n/v/f/c/ Denies headache, syncope, lightheadedness, dizziness. Denies chest palpitations, abdominal pain. Denies edema. Denies dysuria, hematuria, BRBPR, tarry stools, diarrhea, constipation. Pt currently having discomfort.  In ED, T 98.4, HR 69, /82, RR 16, Sat 98 RA. CT a/p noted diffuse colonic wall thickening involving the transverse, descending, sigmoid colon, and rectum indicative of colitis. Pt received cipro, flagyl, NS x1L, morphine 4mg x1.    51M hx of CAD with ?PCI (pt unsure, previous records state he had 3 stents), HTN, GERD, HLD presents with a cc of L sided chest pain 3 days non radiating a/w SOB KRAMER worsening prompting visit also a/w nausea, no vomiting. Patient examined with Ukrainian speaking  Last saw cardiologist x1 year ago, cannot recall name. Reports has had prior admissions for chest pain. No fever, no new LE swelling. Denies n/v/f/c/ Denies headache, syncope, lightheadedness, dizziness. Denies chest palpitations, abdominal pain. Denies edema. Denies dysuria, hematuria, BRBPR, tarry stools, diarrhea, constipation. Pt currently having discomfort.  In ED,  Pt received 162 aspirin and admitted to medicine for further management. .  51M hx of CAD with ?PCI (pt unsure, previous records state he had 3 stents), HTN, GERD, HLD presents with non-radiating L sided chest pain with  associated with  SOB and KRAMER for 3 days.    Patient examined with Turkish speaking . Reports intermittent pain. Better with rest and less stress. Reports SOB and Chest with movement with SOB with 8-9 blocks with exertion. Reports chest pain worsening today with associated nausea prompting him to come into the hospital.   Last saw cardiologist x1 year ago, cannot recall name. Reports has had prior admissions for chest pain. No fever, no new LE swelling. Denies n/v/f/c/ Denies headache, syncope, lightheadedness, dizziness. Denies chest palpitations, abdominal pain. Denies edema. Denies dysuria, hematuria, BRBPR, tarry stools, diarrhea, constipation. Pt currently having discomfort. Patient unclear if he had stents. Reports in [facility], 7 years ago he had a procedure, where he was under anesthesia and they were suppose to place a "ring".    In ED,  Pt received 162 aspirin and admitted to medicine for further management. .  51M hx of CAD with ?PCI (pt unsure, previous records state he had 3 stents) and  GERD, presents with non-radiating L sided chest pain with  associated with  SOB and KRAMER for 3 days.    Patient examined with Slovak speaking . Reports intermittent pain. Better with rest and less stress. Reports SOB and Chest with movement with SOB with 8-9 blocks with exertion. Reports chest pain worsening today with associated nausea prompting him to come into the hospital.   Last saw cardiologist x1 year ago, cannot recall name. Reports has had prior admissions for chest pain. No fever, no new LE swelling. Denies n/v/f/c/ Denies headache, syncope, lightheadedness, dizziness. Denies chest palpitations, abdominal pain. Denies edema. Denies dysuria, hematuria, BRBPR, tarry stools, diarrhea, constipation. Pt currently having discomfort. Patient unclear if he had stents. Reports in [facility], 7 years ago he had a procedure, where he was under anesthesia and they were suppose to place a "ring".    In ED,  Pt received 162 aspirin and admitted to medicine for further management. .

## 2022-04-05 NOTE — H&P ADULT - NSHPPHYSICALEXAM_GEN_ALL_CORE
GENERAL: NAD  HEENT: EOMI, MMM, no oropharyngeal lesions or erythema appreciated  Pulm: normal work of breathing, CTABL  CV: RRR, S1&S2+, no m/r/g appreciated  ABDOMEN: soft, nt, nd, no hepatosplenomegaly  MSK: nl ROM  EXTREMITIES:  no appreciable edema in b/l LE  Neuro: A&Ox3, no focal deficits  SKIN: warm and dry, no visible rash GENERAL: Middle age man in NAD  HEENT: EOMI, MMM, no oropharyngeal lesions or erythema appreciated  Pulm: normal work of breathing, CTABL  CV: RRR, S1&S2+, no m/r/g appreciated  ABDOMEN: soft, nt, nd, no hepatosplenomegaly  MSK: nl ROM  EXTREMITIES:  no appreciable edema in b/l LE  Neuro: A&Ox3, no focal deficits  SKIN: warm and dry, no visible rash

## 2022-04-05 NOTE — H&P ADULT - PROBLEM SELECTOR PLAN 2
? pt unclear if he had stent or cardiac cath in [place] 7 years ago   - - Continue aspirin and high dose statin ? pt unclear if he had stent or cardiac cath in [place] 7 years ago   - - Continue aspirin and high dose statin ? pt unclear if he had stent or cardiac cath in [place] 7 years ago   - - Continue aspirin and started high dose statin

## 2022-04-05 NOTE — ED PROVIDER NOTE - PROGRESS NOTE DETAILS
Narcisa HUTTON: endorsed to dr. gould, request admission to dr pricila joseph, he will let him know.

## 2022-04-05 NOTE — ED PROVIDER NOTE - PHYSICAL EXAMINATION
Narcisa HUTTON:  VITALS: Initial triage and subsequent vitals have been reviewed by me.  GEN APPEARANCE: WDWN, alert and cooperative, non-toxic appearing and in NAD  HEAD: Atraumatic, normocephalic   EYES: PERRLa, EOMI, vision grossly intact.   EARS: Gross hearing intact.   NOSE: No nasal discharge, no external evidence of epistaxis.   NECK: Supple  CV: RRR, S1S2, no c/r/m/g. No cyanosis or pallor. Extremities warm, well perfused. Cap refill <2 seconds. No bruits.   LUNGS: CTAB. No wheezing. No rales. No rhonchi. No diminished breath sounds.   ABDOMEN: Soft, NTND. No guarding or rebound. No masses.   MSK/EXT: Spine appears normal, no spine point tenderness. No CVA ttp. Normal muscular development. Pelvis stable. No obvious joint or bony deformity, no peripheral edema.   NEURO: Alert, follows commands. Weight bearing normal. Speech normal. Sensation and motor normal x4 extremities.   SKIN: Normal color for race, warm, dry and intact. No evidence of rash.  PSYCH: Normal mood and affect.

## 2022-04-05 NOTE — CONSULT NOTE ADULT - SUBJECTIVE AND OBJECTIVE BOX
CHIEF COMPLAINT:Patient is a 51y old  Male who presents with a chief complaint of chest pain (05 Apr 2022 13:47)      HISTORY OF PRESENT ILLNESS:    51 year old male with history as below presents with chest pain 3 days getting worse intensity   no sob  no dizziness  no syncope     PAST MEDICAL & SURGICAL HISTORY:  Sciatic Nerve Disease    HTN (hypertension)    GERD (gastroesophageal reflux disease)    Coronary artery disease involving native coronary artery of native heart without angina pectoris    S/P transurethral resection of prostate  2011            MEDICATIONS:  metoprolol succinate ER 25 milliGRAM(s) Oral daily        acetaminophen     Tablet .. 650 milliGRAM(s) Oral every 6 hours PRN  gabapentin 600 milliGRAM(s) Oral three times a day  melatonin 3 milliGRAM(s) Oral at bedtime PRN  ondansetron Injectable 4 milliGRAM(s) IV Push every 8 hours PRN    aluminum hydroxide/magnesium hydroxide/simethicone Suspension 30 milliLiter(s) Oral every 4 hours PRN  bisacodyl 5 milliGRAM(s) Oral at bedtime  pantoprazole    Tablet 40 milliGRAM(s) Oral before breakfast    atorvastatin 80 milliGRAM(s) Oral at bedtime    calcium carbonate 1250 mG  + Vitamin D (OsCal 500 + D) 1 Tablet(s) Oral two times a day      FAMILY HISTORY:      Non-contributory    SOCIAL HISTORY:    No tobacco, drugs or etoh    Allergies    No Known Allergies    Intolerances    	    REVIEW OF SYSTEMS:  as above  The rest of the 14 points ROS reviewed and except above they are unremarkable.        PHYSICAL EXAM:  T(C): 36.6 (04-05-22 @ 12:30), Max: 36.6 (04-05-22 @ 12:30)  HR: 56 (04-05-22 @ 12:30) (56 - 60)  BP: 105/71 (04-05-22 @ 12:30) (105/71 - 106/71)  RR: 22 (04-05-22 @ 12:30) (18 - 22)  SpO2: 98% (04-05-22 @ 12:30) (98% - 100%)  Wt(kg): --  I&O's Summary      JVP: Normal  Neck: supple  Lung: clear   CV: S1 S2 , Murmur:  Abd: soft  Ext: No edema  neuro: Awake / alert  Psych: flat affect  Skin: normal      LABS/DATA:    TELEMETRY: 	    ECG:  	   	  CARDIAC MARKERS:                        7 <<== 04-05-22 @ 12:25                              15.4   5.79  )-----------( 232      ( 05 Apr 2022 12:25 )             46.3     04-05    137  |  104  |  10  ----------------------------<  99  4.6   |  21<L>  |  1.06    Ca    10.0      05 Apr 2022 12:25    TPro  6.8  /  Alb  4.5  /  TBili  0.6  /  DBili  x   /  AST  18  /  ALT  13  /  AlkPhos  58  04-05    proBNP:   Lipid Profile:   HgA1c:   TSH:

## 2022-04-05 NOTE — ED PROVIDER NOTE - CLINICAL SUMMARY MEDICAL DECISION MAKING FREE TEXT BOX
Narcisa HUTTON: 51M hx of CAD w stents on chart review, HLD presents with a cc of L sided chest pain 3 days non radiating a/w SOB KRAMER worsening prompting visit also a/w nausea, no vomiting. Last saw cardiologist x1 year ago, cannot recall name. Reports has had prior admissions for chest pain. No fever, no new LE swelling exam vss non toxic PE as above ddx c/f acs in high risk patient, will check labs cxr cardiacs, admit for further cardiac workup.

## 2022-04-05 NOTE — H&P ADULT - PROBLEM SELECTOR PROBLEM 1
Coronary artery disease involving native coronary artery of native heart without angina pectoris Chest pain

## 2022-04-05 NOTE — H&P ADULT - PROBLEM SELECTOR PLAN 1
pt with chest pain   - First troponin negative pt with chest pain   - First troponin negative, trend x 2. EKG with T wave inversion in III and vAF t wave flatten   - Cardiology consulted, appreciate recs   - Continue aspirin and high dose statin   - TTE and NST  - F/U A1c and lipid panel pt with chest pain EKG with T wave inversion in III and vAF t wave flatten   - First troponin negative, trend x 2. -next is due at 18:00  - Cardiology consulted, appreciate recs   - Continue aspirin and high dose statin   - F/U TTE and NST  - F/U A1c and lipid panel pt with chest pain EKG with T wave inversion in III and vAF t wave flatten   - First troponin negative, trend x 2. -next is due at 18:00  - Cardiology consulted, appreciate recs   - Continue aspirin and started  high dose statin   - F/U TTE and NST  - F/U A1c and lipid panel

## 2022-04-05 NOTE — ED ADULT TRIAGE NOTE - CHIEF COMPLAINT QUOTE
pt c/o intermittent non radiating left sided chest pain associated with KRAMER. pt states he is unable to walk long distances without feeling SOB. pt denies fevers, chills, n/v/d, diaphoresis. PMH DM, HTN. HLD

## 2022-04-05 NOTE — ED PROVIDER NOTE - ATTENDING CONTRIBUTION TO CARE
I have personally performed a face to face medical and diagnostic evaluation of the patient. I have discussed with and reviewed the Resident's note and agree with the History, ROS, Physical Exam and MDM unless otherwise indicated. A brief summary of my personal evaluation and impression can be found below.    Narcisa HUTTON: Please see the HPI, ROS, PE and MDM as authored by me.

## 2022-04-05 NOTE — ED PROVIDER NOTE - NSICDXPASTMEDICALHX_GEN_ALL_CORE_FT
PAST MEDICAL HISTORY:  Coronary artery disease involving native coronary artery of native heart without angina pectoris     GERD (gastroesophageal reflux disease)     HTN (hypertension)     Sciatic Nerve Disease

## 2022-04-05 NOTE — H&P ADULT - NSHPLABSRESULTS_GEN_ALL_CORE
LABS:  CAPILLARY BLOOD GLUCOSE      POCT Blood Glucose.: 98 mg/dL (05 Apr 2022 10:26)                            15.4   5.79  )-----------( 232      ( 05 Apr 2022 12:25 )             46.3     04-05    137  |  104  |  10  ----------------------------<  99  4.6   |  21<L>  |  1.06    Ca    10.0      05 Apr 2022 12:25    TPro  6.8  /  Alb  4.5  /  TBili  0.6  /  DBili  x   /  AST  18  /  ALT  13  /  AlkPhos  58  04-05                RADIOLOGY & ADDITIONAL TESTS:    Telemetry Personally Reviewed:    ECG Personally Reviewed:    Imaging Personally Reviewed:    Imaging Reviewed:     Consultant(s) Notes Reviewed:      Care Discussed with Consultants/Other Providers: LABS:  CAPILLARY BLOOD GLUCOSE      POCT Blood Glucose.: 98 mg/dL (05 Apr 2022 10:26)                            15.4   5.79  )-----------( 232      ( 05 Apr 2022 12:25 )             46.3     04-05    137  |  104  |  10  ----------------------------<  99  4.6   |  21<L>  |  1.06    Ca    10.0      05 Apr 2022 12:25    TPro  6.8  /  Alb  4.5  /  TBili  0.6  /  DBili  x   /  AST  18  /  ALT  13  /  AlkPhos  58  04-05                RADIOLOGY & ADDITIONAL TESTS:    Telemetry Personally Reviewed:    ECG Personally Reviewed: EKG with T wave inversion in III and vAF t wave flatten     Imaging Personally Reviewed:    Imaging Reviewed:     Consultant(s) Notes Reviewed:      Care Discussed with Consultants/Other Providers:

## 2022-04-05 NOTE — H&P ADULT - NSHPREVIEWOFSYSTEMS_GEN_ALL_CORE
REVIEW OF SYSTEMS:    CONSTITUTIONAL: No weakness, fevers or chills  EYES/ENT: No visual changes;  no throat pain   NECK: No pain or stiffness  RESPIRATORY: No cough, wheezing, hemoptysis; No shortness of breath  CARDIOVASCULAR: No chest pain or palpitations  GASTROINTESTINAL: No abdominal or epigastric pain. No nausea, vomiting, or hematemesis; No diarrhea or constipation. No melena or hematochezia.  GENITOURINARY: No dysuria, change in frequency or hematuria  NEUROLOGICAL: No numbness or weakness  SKIN: No itching, burning, rashes, or lesions   Psych: No depression   All other review of systems is negative unless indicated above. REVIEW OF SYSTEMS:    CONSTITUTIONAL: No weakness, fevers or chills  EYES/ENT: No visual changes;  no throat pain   NECK: No pain or stiffness  RESPIRATORY: No cough, wheezing, hemoptysis; No shortness of breath  CARDIOVASCULAR: ++ chest pain or palpitations  GASTROINTESTINAL: No abdominal or epigastric pain. No nausea, vomiting, or hematemesis; No diarrhea or constipation. No melena or hematochezia.  GENITOURINARY: No dysuria, change in frequency or hematuria  NEUROLOGICAL: No numbness or weakness  SKIN: No itching, burning, rashes, or lesions   Psych: No depression   All other review of systems is negative unless indicated above.

## 2022-04-05 NOTE — H&P ADULT - ASSESSMENT
51M hx of CAD with ?PCI (pt unsure, previous records state he had 3 stents), HTN, GERD, HLD presents with non-radiating L sided chest pain with  associated with  SOB and KRAMER for 3 days. R/o ACS

## 2022-04-05 NOTE — H&P ADULT - PROBLEM SELECTOR PROBLEM 2
GERD (gastroesophageal reflux disease) Coronary artery disease involving native coronary artery of native heart without angina pectoris

## 2022-04-05 NOTE — ED ADULT NURSE NOTE - OBJECTIVE STATEMENT
Pt received Aox4, ambulatory at baseline w. pmhx of CAD w. stents, HLD, HTN presents to the ED w. chief complaint of LS CP ongoing for the past 3 days associated w. KRAMER and nausea w/o vomiting. Denies chills, fever, cough abd pain,  symptoms, lightheadedness, syncope at this time. 20G placed in RAC. Breathing even and unlabored, saturating 100%.

## 2022-04-05 NOTE — ED PROVIDER NOTE - OBJECTIVE STATEMENT
Narcisa HUTTON: 51M hx of CAD w stents on chart review, HLD presents with a cc of L sided chest pain 3 days non radiating a/w SOB KRAMER worsening prompting visit also a/w nausea, no vomiting. Last saw cardiologist x1 year ago, cannot recall name. Reports has had prior admissions for chest pain. No fever, no new LE swelling. Denies n/v/f/c/ Denies headache, syncope, lightheadedness, dizziness. Denies chest palpitations, abdominal pain. Denies edema. Denies dysuria, hematuria, BRBPR, tarry stools, diarrhea, constipation. Pt currently having discomfort.

## 2022-04-05 NOTE — PHARMACOTHERAPY INTERVENTION NOTE - COMMENTS
Medication history is complete. Medication list updated in Outpatient Medication Record (OMR). Please call spectra [phone] if you have any questions.   source: patient and pharmacy

## 2022-04-06 ENCOUNTER — TRANSCRIPTION ENCOUNTER (OUTPATIENT)
Age: 52
End: 2022-04-06

## 2022-04-06 VITALS
OXYGEN SATURATION: 99 % | TEMPERATURE: 98 F | HEART RATE: 65 BPM | DIASTOLIC BLOOD PRESSURE: 67 MMHG | RESPIRATION RATE: 17 BRPM | SYSTOLIC BLOOD PRESSURE: 114 MMHG

## 2022-04-06 LAB
A1C WITH ESTIMATED AVERAGE GLUCOSE RESULT: 5.5 % — SIGNIFICANT CHANGE UP (ref 4–5.6)
ALBUMIN SERPL ELPH-MCNC: 4.6 G/DL — SIGNIFICANT CHANGE UP (ref 3.3–5)
ALP SERPL-CCNC: 61 U/L — SIGNIFICANT CHANGE UP (ref 40–120)
ALT FLD-CCNC: 16 U/L — SIGNIFICANT CHANGE UP (ref 4–41)
ANION GAP SERPL CALC-SCNC: 14 MMOL/L — SIGNIFICANT CHANGE UP (ref 7–14)
AST SERPL-CCNC: 15 U/L — SIGNIFICANT CHANGE UP (ref 4–40)
BILIRUB SERPL-MCNC: 1.2 MG/DL — SIGNIFICANT CHANGE UP (ref 0.2–1.2)
BUN SERPL-MCNC: 8 MG/DL — SIGNIFICANT CHANGE UP (ref 7–23)
CALCIUM SERPL-MCNC: 9.8 MG/DL — SIGNIFICANT CHANGE UP (ref 8.4–10.5)
CHLORIDE SERPL-SCNC: 102 MMOL/L — SIGNIFICANT CHANGE UP (ref 98–107)
CHOLEST SERPL-MCNC: 162 MG/DL — SIGNIFICANT CHANGE UP
CO2 SERPL-SCNC: 22 MMOL/L — SIGNIFICANT CHANGE UP (ref 22–31)
CREAT SERPL-MCNC: 0.94 MG/DL — SIGNIFICANT CHANGE UP (ref 0.5–1.3)
EGFR: 99 ML/MIN/1.73M2 — SIGNIFICANT CHANGE UP
ESTIMATED AVERAGE GLUCOSE: 111 — SIGNIFICANT CHANGE UP
GLUCOSE SERPL-MCNC: 140 MG/DL — HIGH (ref 70–99)
HCT VFR BLD CALC: 47.4 % — SIGNIFICANT CHANGE UP (ref 39–50)
HDLC SERPL-MCNC: 32 MG/DL — LOW
HGB BLD-MCNC: 16.2 G/DL — SIGNIFICANT CHANGE UP (ref 13–17)
LIPID PNL WITH DIRECT LDL SERPL: 81 MG/DL — SIGNIFICANT CHANGE UP
MAGNESIUM SERPL-MCNC: 2 MG/DL — SIGNIFICANT CHANGE UP (ref 1.6–2.6)
MCHC RBC-ENTMCNC: 28.7 PG — SIGNIFICANT CHANGE UP (ref 27–34)
MCHC RBC-ENTMCNC: 34.2 GM/DL — SIGNIFICANT CHANGE UP (ref 32–36)
MCV RBC AUTO: 83.9 FL — SIGNIFICANT CHANGE UP (ref 80–100)
NON HDL CHOLESTEROL: 130 MG/DL — HIGH
NRBC # BLD: 0 /100 WBCS — SIGNIFICANT CHANGE UP
NRBC # FLD: 0 K/UL — SIGNIFICANT CHANGE UP
PHOSPHATE SERPL-MCNC: 3.1 MG/DL — SIGNIFICANT CHANGE UP (ref 2.5–4.5)
PLATELET # BLD AUTO: 220 K/UL — SIGNIFICANT CHANGE UP (ref 150–400)
POTASSIUM SERPL-MCNC: 4.6 MMOL/L — SIGNIFICANT CHANGE UP (ref 3.5–5.3)
POTASSIUM SERPL-SCNC: 4.6 MMOL/L — SIGNIFICANT CHANGE UP (ref 3.5–5.3)
PROT SERPL-MCNC: 7.2 G/DL — SIGNIFICANT CHANGE UP (ref 6–8.3)
RBC # BLD: 5.65 M/UL — SIGNIFICANT CHANGE UP (ref 4.2–5.8)
RBC # FLD: 12.6 % — SIGNIFICANT CHANGE UP (ref 10.3–14.5)
SODIUM SERPL-SCNC: 138 MMOL/L — SIGNIFICANT CHANGE UP (ref 135–145)
TRIGL SERPL-MCNC: 246 MG/DL — HIGH
TROPONIN T, HIGH SENSITIVITY RESULT: 7 NG/L — SIGNIFICANT CHANGE UP
WBC # BLD: 6.08 K/UL — SIGNIFICANT CHANGE UP (ref 3.8–10.5)
WBC # FLD AUTO: 6.08 K/UL — SIGNIFICANT CHANGE UP (ref 3.8–10.5)

## 2022-04-06 PROCEDURE — 93018 CV STRESS TEST I&R ONLY: CPT | Mod: GC

## 2022-04-06 PROCEDURE — 78452 HT MUSCLE IMAGE SPECT MULT: CPT | Mod: 26

## 2022-04-06 PROCEDURE — 93306 TTE W/DOPPLER COMPLETE: CPT | Mod: 26

## 2022-04-06 PROCEDURE — 93016 CV STRESS TEST SUPVJ ONLY: CPT | Mod: GC

## 2022-04-06 RX ORDER — ATORVASTATIN CALCIUM 80 MG/1
1 TABLET, FILM COATED ORAL
Qty: 30 | Refills: 0
Start: 2022-04-06 | End: 2022-05-05

## 2022-04-06 RX ADMIN — Medication 25 MILLIGRAM(S): at 05:56

## 2022-04-06 RX ADMIN — Medication 81 MILLIGRAM(S): at 11:32

## 2022-04-06 RX ADMIN — GABAPENTIN 600 MILLIGRAM(S): 400 CAPSULE ORAL at 13:24

## 2022-04-06 RX ADMIN — PANTOPRAZOLE SODIUM 40 MILLIGRAM(S): 20 TABLET, DELAYED RELEASE ORAL at 05:56

## 2022-04-06 RX ADMIN — Medication 1 TABLET(S): at 05:56

## 2022-04-06 RX ADMIN — GABAPENTIN 600 MILLIGRAM(S): 400 CAPSULE ORAL at 05:56

## 2022-04-06 NOTE — DISCHARGE NOTE PROVIDER - PROVIDER TOKENS
FREE:[LAST:[Your primary care physician],PHONE:[(   )    -],FAX:[(   )    -]],FREE:[LAST:[Your outpatient cardiologist],PHONE:[(   )    -],FAX:[(   )    -]] FREE:[LAST:[Your primary care physician],PHONE:[(   )    -],FAX:[(   )    -]],PROVIDER:[TOKEN:[[id number]]]

## 2022-04-06 NOTE — PATIENT PROFILE ADULT - NSTOBACCONEVERSMOKERY/N_GEN_A
Is This A New Presentation, Or A Follow-Up?: Skin Lesion What Type Of Note Output Would You Prefer (Optional)?: Bullet Format How Severe Is Your Skin Lesion?: mild Has Your Skin Lesion Been Treated?: not been treated No

## 2022-04-06 NOTE — PATIENT PROFILE ADULT - BRAND OF COVID-19 VACCINATION
Pfizer dose 1, 2, and 3 unsure of date/Pfizer dose 1, 2, and 3 Pt unsure of date/Pfizer dose 1, 2, and 3

## 2022-04-06 NOTE — PATIENT PROFILE ADULT - FALL HARM RISK - RISK INTERVENTIONS
Assistance OOB with selected safe patient handling equipment/Assistance with ambulation/Communicate Fall Risk and Risk Factors to all staff, patient, and family/Reinforce activity limits and safety measures with patient and family/Visual Cue: Yellow wristband/Bed in lowest position, wheels locked, appropriate side rails in place/Call bell, personal items and telephone in reach/Instruct patient to call for assistance before getting out of bed or chair/Non-slip footwear when patient is out of bed/[place] to call system/Physically safe environment - no spills, clutter or unnecessary equipment/Purposeful Proactive Rounding/Room/bathroom lighting operational, light cord in reach Cheiloplasty (Complex) Text: A decision was made to reconstruct the defect with a  cheiloplasty.  The defect was undermined extensively.  Additional obicularis oris muscle was excised with a 15 blade scalpel.  The defect was converted into a full thickness wedge to facilite a better cosmetic result.  Small vessels were then tied off with 5-0 monocyrl. The obicularis oris, superficial fascia, adipose and dermis were then reapproximated.  After the deeper layers were approximated the epidermis was reapproximated with particular care given to realign the vermillion border.

## 2022-04-06 NOTE — DISCHARGE NOTE PROVIDER - NSDCCPCAREPLAN_GEN_ALL_CORE_FT
PRINCIPAL DISCHARGE DIAGNOSIS  Diagnosis: Chest pain  Assessment and Plan of Treatment: You were admitted in the hospital for chest pain. Your cardiac workup including echocardiogram and nuclear stress test were normal. Your symptoms have improved upon discharge. Follow up with your primary care physician and/or cardiologist outpatient.      SECONDARY DISCHARGE DIAGNOSES  Diagnosis: HTN (hypertension)  Assessment and Plan of Treatment: Low sodium and fat diet, continue anti-hypertensive medications, and follow up with primary care physician.

## 2022-04-06 NOTE — DISCHARGE NOTE PROVIDER - CARE PROVIDER_API CALL
Your primary care physician,   Phone: (   )    -  Fax: (   )    -  Follow Up Time:     Your outpatient cardiologist,   Phone: (   )    -  Fax: (   )    -  Follow Up Time:    Your primary care physician,   Phone: (   )    -  Fax: (   )    -  Follow Up Time:     Gopal Dumont  CARDIOVASCULAR DISEASE  [address] [address]  Phone: [phone]  Fax: [phone]  Follow Up Time:

## 2022-04-06 NOTE — DISCHARGE NOTE PROVIDER - NSDCMRMEDTOKEN_GEN_ALL_CORE_FT
aspirin 81 mg oral delayed release tablet: 1 tab(s) orally once a day  atorvastatin 80 mg oral tablet: 1 tab(s) orally once a day (at bedtime)  bisacodyl 5 mg oral delayed release tablet: 3 tab(s) orally once a day (at bedtime)  calcium-vitamin D 500 mg-5 mcg (200 intl units) oral tablet: 1 tab(s) orally 2 times a day  Fish Oil 1200 mg oral capsule: 2 cap(s) orally 2 times a day  OTC supplement  gabapentin 600 mg oral tablet: 1 tab(s) orally 3 times a day  ibuprofen 200 mg oral tablet: 1 tab(s) orally every 6 hours, As Needed  Metoprolol Succinate ER 25 mg oral tablet, extended release: 1 tab(s) orally once a day  omeprazole 40 mg oral delayed release capsule: 1 cap(s) orally once a day  Thera M oral tablet: 1 tab(s) orally once a day  Vitamin B Complex oral tablet: 1 tab(s) orally once a day

## 2022-04-06 NOTE — DISCHARGE NOTE NURSING/CASE MANAGEMENT/SOCIAL WORK - PATIENT PORTAL LINK FT
You can access the FollowMyHealth Patient Portal offered by [facility] by registering at the following website: [email or web address]. By joining [employer]’s FollowMyHealth portal, you will also be able to view your health information using other applications (apps) compatible with our system.

## 2022-04-06 NOTE — DISCHARGE NOTE NURSING/CASE MANAGEMENT/SOCIAL WORK - NSDCPEFALRISK_GEN_ALL_CORE
For information on Fall & Injury Prevention, visit: [email or web address] OR  [email or web address] OR  https://www.cdc.gov/steadi/patient.html

## 2022-04-06 NOTE — PROGRESS NOTE ADULT - SUBJECTIVE AND OBJECTIVE BOX
DATE OF SERVICE: 04-06-22 @ 09:29    Subjective: Patient seen and examined. No new events except as noted.     SUBJECTIVE/ROS:  feels ok       MEDICATIONS:  MEDICATIONS  (STANDING):  aspirin enteric coated 81 milliGRAM(s) Oral daily  atorvastatin 80 milliGRAM(s) Oral at bedtime  bisacodyl 5 milliGRAM(s) Oral at bedtime  calcium carbonate 1250 mG  + Vitamin D (OsCal 500 + D) 1 Tablet(s) Oral two times a day  gabapentin 600 milliGRAM(s) Oral three times a day  metoprolol succinate ER 25 milliGRAM(s) Oral daily  omega-3-Acid Ethyl Esters 2 Gram(s) Oral two times a day  pantoprazole    Tablet 40 milliGRAM(s) Oral before breakfast      PHYSICAL EXAM:  T(C): 36.3 (04-06-22 @ 05:50), Max: 36.8 (04-05-22 @ 15:25)  HR: 72 (04-06-22 @ 05:50) (56 - 87)  BP: 102/62 (04-06-22 @ 05:50) (95/59 - 106/71)  RR: 18 (04-06-22 @ 05:50) (16 - 22)  SpO2: 100% (04-06-22 @ 05:50) (94% - 100%)  Wt(kg): --  I&O's Summary    Height (cm): 172.7 (04-06 @ 01:45)  Weight (kg): 74.3 (04-06 @ 01:45)  BMI (kg/m2): 24.9 (04-06 @ 01:45)  BSA (m2): 1.88 (04-06 @ 01:45)      JVP: Normal  Neck: supple  Lung: clear   CV: S1 S2 , Murmur:  Abd: soft  Ext: No edema  neuro: Awake / alert  Psych: flat affect  Skin: normal``    LABS/DATA:    CARDIAC MARKERS:                                15.4   5.79  )-----------( 232      ( 05 Apr 2022 12:25 )             46.3     04-05    137  |  104  |  10  ----------------------------<  99  4.6   |  21<L>  |  1.06    Ca    10.0      05 Apr 2022 12:25    TPro  6.8  /  Alb  4.5  /  TBili  0.6  /  DBili  x   /  AST  18  /  ALT  13  /  AlkPhos  58  04-05    proBNP:   Lipid Profile:   HgA1c:   TSH:     TELE:  EKG:

## 2022-04-06 NOTE — DISCHARGE NOTE PROVIDER - HOSPITAL COURSE
51M hx of CAD with ?PCI (pt unsure, previous records state he had 3 stents) and  GERD, presents with non-radiating L sided chest pain with  associated with  SOB and KRAMER for 3 days.       Chest pain.   pt with chest pain EKG with T wave inversion in III and vAF t wave flatten   - First troponin negative, trend x 2. -next is due at 18:00  - Cardiology consulted, appreciate recs   - Continue aspirin and started  high dose statin   - TTE and NST normal     Coronary artery disease involving native coronary artery of native heart without angina pectoris.   - pt unclear if he had stent or cardiac cath in [place] 7 years ago   - Continue aspirin and started high dose statin.    GERD (gastroesophageal reflux disease).   - Continue PPI.    HTN (hypertension).   BP is currently normotensive  - will continue metoprolol as this can have anti-anginal effect.    Patient is medically cleared for discharge on 4/6/2022 per attending Dr. Gallagher.

## 2022-05-04 ENCOUNTER — EMERGENCY (EMERGENCY)
Facility: HOSPITAL | Age: 52
LOS: 1 days | Discharge: HOME | End: 2022-05-04
Admitting: EMERGENCY MEDICINE
Payer: MEDICAID

## 2022-05-04 VITALS
SYSTOLIC BLOOD PRESSURE: 121 MMHG | HEART RATE: 70 BPM | OXYGEN SATURATION: 100 % | HEIGHT: 68 IN | DIASTOLIC BLOOD PRESSURE: 81 MMHG | TEMPERATURE: 98 F | RESPIRATION RATE: 18 BRPM

## 2022-05-04 LAB
ALBUMIN SERPL ELPH-MCNC: 4.1 G/DL — SIGNIFICANT CHANGE UP (ref 3.3–5)
ALP SERPL-CCNC: 65 U/L — SIGNIFICANT CHANGE UP (ref 40–120)
ALT FLD-CCNC: 16 U/L — SIGNIFICANT CHANGE UP (ref 4–41)
ANION GAP SERPL CALC-SCNC: 9 MMOL/L — SIGNIFICANT CHANGE UP (ref 7–14)
APPEARANCE UR: CLEAR — SIGNIFICANT CHANGE UP
AST SERPL-CCNC: 19 U/L — SIGNIFICANT CHANGE UP (ref 4–40)
BASE EXCESS BLDV CALC-SCNC: 0.2 MMOL/L — SIGNIFICANT CHANGE UP (ref -2–3)
BASOPHILS # BLD AUTO: 0.06 K/UL — SIGNIFICANT CHANGE UP (ref 0–0.2)
BASOPHILS NFR BLD AUTO: 0.8 % — SIGNIFICANT CHANGE UP (ref 0–2)
BILIRUB SERPL-MCNC: 0.6 MG/DL — SIGNIFICANT CHANGE UP (ref 0.2–1.2)
BILIRUB UR-MCNC: NEGATIVE — SIGNIFICANT CHANGE UP
BLOOD GAS VENOUS COMPREHENSIVE RESULT: SIGNIFICANT CHANGE UP
BUN SERPL-MCNC: 9 MG/DL — SIGNIFICANT CHANGE UP (ref 7–23)
CALCIUM SERPL-MCNC: 9.2 MG/DL — SIGNIFICANT CHANGE UP (ref 8.4–10.5)
CHLORIDE BLDV-SCNC: 105 MMOL/L — SIGNIFICANT CHANGE UP (ref 96–108)
CHLORIDE SERPL-SCNC: 106 MMOL/L — SIGNIFICANT CHANGE UP (ref 98–107)
CO2 BLDV-SCNC: 26.7 MMOL/L — HIGH (ref 22–26)
CO2 SERPL-SCNC: 24 MMOL/L — SIGNIFICANT CHANGE UP (ref 22–31)
COLOR SPEC: YELLOW — SIGNIFICANT CHANGE UP
CREAT SERPL-MCNC: 0.9 MG/DL — SIGNIFICANT CHANGE UP (ref 0.5–1.3)
DIFF PNL FLD: NEGATIVE — SIGNIFICANT CHANGE UP
EGFR: 104 ML/MIN/1.73M2 — SIGNIFICANT CHANGE UP
EOSINOPHIL # BLD AUTO: 0.18 K/UL — SIGNIFICANT CHANGE UP (ref 0–0.5)
EOSINOPHIL NFR BLD AUTO: 2.3 % — SIGNIFICANT CHANGE UP (ref 0–6)
GAS PNL BLDV: 133 MMOL/L — LOW (ref 136–145)
GLUCOSE BLDV-MCNC: 88 MG/DL — SIGNIFICANT CHANGE UP (ref 70–99)
GLUCOSE SERPL-MCNC: 87 MG/DL — SIGNIFICANT CHANGE UP (ref 70–99)
GLUCOSE UR QL: NEGATIVE — SIGNIFICANT CHANGE UP
HCO3 BLDV-SCNC: 25 MMOL/L — SIGNIFICANT CHANGE UP (ref 22–29)
HCT VFR BLD CALC: 43.3 % — SIGNIFICANT CHANGE UP (ref 39–50)
HCT VFR BLDA CALC: 46 % — SIGNIFICANT CHANGE UP (ref 39–51)
HGB BLD CALC-MCNC: 15.4 G/DL — SIGNIFICANT CHANGE UP (ref 13–17)
HGB BLD-MCNC: 15 G/DL — SIGNIFICANT CHANGE UP (ref 13–17)
IANC: 3.96 K/UL — SIGNIFICANT CHANGE UP (ref 1.8–7.4)
IMM GRANULOCYTES NFR BLD AUTO: 0.4 % — SIGNIFICANT CHANGE UP (ref 0–1.5)
KETONES UR-MCNC: NEGATIVE — SIGNIFICANT CHANGE UP
LACTATE BLDV-MCNC: 1.6 MMOL/L — SIGNIFICANT CHANGE UP (ref 0.5–2)
LEUKOCYTE ESTERASE UR-ACNC: NEGATIVE — SIGNIFICANT CHANGE UP
LIDOCAIN IGE QN: 50 U/L — SIGNIFICANT CHANGE UP (ref 7–60)
LYMPHOCYTES # BLD AUTO: 3.04 K/UL — SIGNIFICANT CHANGE UP (ref 1–3.3)
LYMPHOCYTES # BLD AUTO: 38.8 % — SIGNIFICANT CHANGE UP (ref 13–44)
MCHC RBC-ENTMCNC: 29.1 PG — SIGNIFICANT CHANGE UP (ref 27–34)
MCHC RBC-ENTMCNC: 34.6 GM/DL — SIGNIFICANT CHANGE UP (ref 32–36)
MCV RBC AUTO: 84.1 FL — SIGNIFICANT CHANGE UP (ref 80–100)
MONOCYTES # BLD AUTO: 0.56 K/UL — SIGNIFICANT CHANGE UP (ref 0–0.9)
MONOCYTES NFR BLD AUTO: 7.2 % — SIGNIFICANT CHANGE UP (ref 2–14)
NEUTROPHILS # BLD AUTO: 3.96 K/UL — SIGNIFICANT CHANGE UP (ref 1.8–7.4)
NEUTROPHILS NFR BLD AUTO: 50.5 % — SIGNIFICANT CHANGE UP (ref 43–77)
NITRITE UR-MCNC: NEGATIVE — SIGNIFICANT CHANGE UP
NRBC # BLD: 0 /100 WBCS — SIGNIFICANT CHANGE UP
NRBC # FLD: 0 K/UL — SIGNIFICANT CHANGE UP
OB PNL STL: NEGATIVE — SIGNIFICANT CHANGE UP
PCO2 BLDV: 42 MMHG — SIGNIFICANT CHANGE UP (ref 42–55)
PH BLDV: 7.39 — SIGNIFICANT CHANGE UP (ref 7.32–7.43)
PH UR: 6 — SIGNIFICANT CHANGE UP (ref 5–8)
PLATELET # BLD AUTO: 217 K/UL — SIGNIFICANT CHANGE UP (ref 150–400)
PO2 BLDV: 139 MMHG — SIGNIFICANT CHANGE UP
POTASSIUM BLDV-SCNC: 4.3 MMOL/L — SIGNIFICANT CHANGE UP (ref 3.5–5.1)
POTASSIUM SERPL-MCNC: 4.3 MMOL/L — SIGNIFICANT CHANGE UP (ref 3.5–5.3)
POTASSIUM SERPL-SCNC: 4.3 MMOL/L — SIGNIFICANT CHANGE UP (ref 3.5–5.3)
PROT SERPL-MCNC: 6.3 G/DL — SIGNIFICANT CHANGE UP (ref 6–8.3)
PROT UR-MCNC: NEGATIVE — SIGNIFICANT CHANGE UP
RBC # BLD: 5.15 M/UL — SIGNIFICANT CHANGE UP (ref 4.2–5.8)
RBC # FLD: 12.5 % — SIGNIFICANT CHANGE UP (ref 10.3–14.5)
SAO2 % BLDV: 98 % — SIGNIFICANT CHANGE UP
SODIUM SERPL-SCNC: 139 MMOL/L — SIGNIFICANT CHANGE UP (ref 135–145)
SP GR SPEC: 1.01 — SIGNIFICANT CHANGE UP (ref 1–1.05)
UROBILINOGEN FLD QL: SIGNIFICANT CHANGE UP
WBC # BLD: 7.83 K/UL — SIGNIFICANT CHANGE UP (ref 3.8–10.5)
WBC # FLD AUTO: 7.83 K/UL — SIGNIFICANT CHANGE UP (ref 3.8–10.5)

## 2022-05-04 PROCEDURE — 99284 EMERGENCY DEPT VISIT MOD MDM: CPT

## 2022-05-04 NOTE — ED ADULT NURSE NOTE - OBJECTIVE STATEMENT
Pt c/o bloody stool, abdominal cramping with BM. Labs collected, will be seen by Provider, Yakut speaking.

## 2022-05-04 NOTE — ED PROVIDER NOTE - PHYSICAL EXAMINATION
Vital signs reviewed.   CONSTITUTIONAL: Well-appearing; well-nourished; in no apparent distress. Non-toxic appearing.   HEAD: Normocephalic, atraumatic.  EYES: Normal conjunctiva and no sclera injection noted  ENT: normal nose; no rhinorrhea  CARD: Normal S1, S2  RESP: Normal chest excursion with respiration; breath sounds clear and equal bilaterally  ABD/GI: soft, non-distended; non-tender; no CVA tenderness  Rectal: No external masses, lesions noted. Normal spincther tone. No suzi blood  EXT/MS: moves all extremities; distal pulses are normal, no pedal edema.  SKIN: Normal for age and race; warm; dry; good turgor; no apparent lesions or exudate noted.  NEURO: Awake, alert, oriented x 3  PSYCH: Normal mood; appropriate affect.

## 2022-05-04 NOTE — ED PROVIDER NOTE - CLINICAL SUMMARY MEDICAL DECISION MAKING FREE TEXT BOX
50 Y/O M w/ PMH of CAD, HTN and HLD presents to ER for bloody stool.   Occult sent  CBC assess HGB  CMP assess renal function  No acute findings on physical exam. Benign abdomen. No SX HX  Likely DC w/ GI follow up

## 2022-05-04 NOTE — ED ADULT TRIAGE NOTE - CHIEF COMPLAINT QUOTE
pt reports blood in stool x 3 days. pt states stool is soft. pt c/o abdominal cramping when passing stool and lightheadedness. pt denies AC use, sob, chest pain, n/v/d, fevers, cough. PMH HTN, HDL.

## 2022-05-04 NOTE — ED PROVIDER NOTE - NS ED ROS FT
Constitutional: (-) fever  Head: Normal cephalic, Atraumatic  Eyes/ENT: (-) vision changes, (-) hearing chnages  Cardiovascular: (-) chest pain, (-) wheezing  Respiratory: (-) cough, (-) shortness of breath  Gastrointestinal: (-) vomiting, (-) diarrhea, (-) abdominal pain (+) bloody stool  : (-) dysuria   Musculoskeletal: (-) back pain  Integumentary: (-) rash, (-) edema  Neurological: (-)loc  Allergic/Immunologic: (-) pruritus

## 2022-05-04 NOTE — ED PROVIDER NOTE - NSFOLLOWUPCLINICS_GEN_ALL_ED_FT
[facility] Gastroenter[facility]  Gastroenterology  [address] [place] [id number]  Phone: [phone]  Fax:   Follow Up Time: 1-3 Days

## 2022-05-04 NOTE — ED PROVIDER NOTE - PATIENT PORTAL LINK FT
You can access the FollowMyHealth Patient Portal offered by [facility] by registering at the following website: [email or web address]. By joining [employer]’s FollowMyHealth portal, you will also be able to view your health information using other applications (apps) compatible with our system.

## 2022-05-04 NOTE — ED PROVIDER NOTE - NSFOLLOWUPINSTRUCTIONS_ED_ALL_ED_FT
1) Follow up with your primary care or Gastroenterology  2) Return to the ED immediately for new or worsening symptoms including fever, nausea/vomiting, dizziness, headache  3) Please continue to take any home medications as prescribed. Take tylenol 325 mg every 4 hours for pain relief/fever control or ibuprofen 600 mg every 6 hours for pain relief/fever control  4) Your test results from your ED visit were discussed with you prior to discharge  5) You were provided with a copy of your test results

## 2022-05-04 NOTE — ED PROVIDER NOTE - OBJECTIVE STATEMENT
50 Y/O M w/ PMH of CAD, HTN and HLD presents to ER for bloody stool. Observed bark blood on stool 3 days ago. Has since noted mucus on stool but no blood. No previous episodes. Seen by gastro 2 years ago and completed colonoscopy w/o acute findings. No recent travel, no sick contacts, no recent abx use or dietary changes. Experiences constipation but had normal bowel movement today. Associated cramping abdominal pain prior to bowel movement. Denies fever, nausea/vomiting, dizziness, headache, chest pain, shortness of breath, dysuria, hematuria or increased frequency

## 2022-05-05 LAB
CULTURE RESULTS: SIGNIFICANT CHANGE UP
SPECIMEN SOURCE: SIGNIFICANT CHANGE UP

## 2023-07-18 NOTE — ED ADULT NURSE NOTE - CCCP TRG CHIEF CMPLNT
Outgoing Calls:  7/18/2023    CMOC called Volodymyr and spoke with [facility] who informed they received legible copy of pt's ID which [facility] submitted last week. Pt is now scheduled for an in person evaluation with Lea tomorrow at [address] [place] [address] informed Marito Schaefer will pick him up and he should be ready by 10:30AM. [facility] informed she will call him and update him. CMOC called pt and informed him of this. Pt informed he is headed to [place] for his scheduled appointment today at [facility] informed he de la garza snot have any scheduled appointments at [facility] at all. [facility] informed he has a Cardio appointment at end of August and a dental appointment next year.      Next outreach is scheduled for 7/24/2023 AT 11AM at [facility]. chest pain

## 2023-09-05 NOTE — ED ADULT NURSE NOTE - PRO INTERPRETER NEED 2
----- Message from Serafin Perera MD sent at 9/4/2023 11:14 AM CDT -----  Pls notify patient with results. Colon polyps. The pathology results demonstrated Tubular adenoma. Also, the pathology results demonstrated Serrated adenoma. Schedule repeat Colonoscopy in 5 years  
Result letter sent to patient. Surgical history and health maintenance updated. Wait list appointment entered.    
English

## 2023-12-28 NOTE — PATIENT PROFILE ADULT - HAVE YOU EXPERIENCED VIOLENCE OR A TRAUMATIC EVENT?
Return call to patient, C/O ABD pain and ABD swelling/Distension since Hospital D/C on 12/23/2023. States pain is 6/10 out of 0-10 pain scale.Denies Nausea and diarrhea, normal BM pattern. States he has been vomiting daily after taking his prednisone.Also states he is drinking over 2 liters of H2O daily but does have a decrease in urine output.  Patient voice a understanding to present to the ER now and keep coordinator posted.    ----- Message from Johnathan Calix sent at 12/28/2023 11:34 AM CST -----  Regarding: call back  Pt call to speak with Farhan in regards to not feeling requesting call back    Call  or      no

## 2024-02-03 NOTE — ED ADULT NURSE NOTE - NSFALLRSKHARMRISK_ED_ALL_ED
no

## 2024-07-30 NOTE — ED PROVIDER NOTE - NSTIMEPROVIDERCAREINITIATE_GEN_ER
12-Mar-2020 20:27
Right sided back pain that started under the right breast. Diarrhea x 3 days and sporadic elevated BP's.  told by  to come in for blood work.

## 2024-08-01 NOTE — PATIENT PROFILE ADULT - DO YOU FEEL LIKE HURTING YOURSELF OR OTHERS?
and dry. Patient is not diaphoretic.   Psychiatric: Patient's speech is normal and behavior is normal. Thought content normal.   Vitals reviewed.      No results found for: \"WBC\", \"HGB\", \"HCT\", \"PLT\", \"CHOL\", \"TRIG\", \"HDL\", \"LDLDIRECT\", \"ALT\", \"AST\", \"NA\", \"K\", \"CL\", \"CREATININE\", \"BUN\", \"CO2\", \"TSH\", \"PSA\", \"INR\", \"GLUF\", \"LABA1C\"  No results found for: \"CALCIUM\", \"PHOS\"  No results found for: \"LDLDIRECT\"    Please note that this chart was generated using voice recognition Dragon dictation software. Although every effort was made to ensure the accuracy of this automated transcription, some errors in transcription may have occurred.    Electronically signed by Dr. Manuel Ordaz MD on 8/1/2024 at 12:06 PM    
no

## 2024-10-01 ENCOUNTER — EMERGENCY (EMERGENCY)
Facility: HOSPITAL | Age: 54
LOS: 1 days | Discharge: HOME | End: 2024-10-01
Attending: PERSONAL EMERGENCY RESPONSE ATTENDANT | Admitting: EMERGENCY MEDICINE
Payer: MEDICAID

## 2024-10-01 VITALS
OXYGEN SATURATION: 97 % | TEMPERATURE: 98 F | SYSTOLIC BLOOD PRESSURE: 129 MMHG | RESPIRATION RATE: 18 BRPM | DIASTOLIC BLOOD PRESSURE: 82 MMHG | HEART RATE: 96 BPM

## 2024-10-01 LAB
ADD ON TEST-SPECIMEN IN LAB: SIGNIFICANT CHANGE UP
ALBUMIN SERPL ELPH-MCNC: 4.3 G/DL — SIGNIFICANT CHANGE UP (ref 3.3–5)
ALP SERPL-CCNC: 70 U/L — SIGNIFICANT CHANGE UP (ref 40–120)
ALT FLD-CCNC: 35 U/L — SIGNIFICANT CHANGE UP (ref 4–41)
ANION GAP SERPL CALC-SCNC: 9 MMOL/L — SIGNIFICANT CHANGE UP (ref 7–14)
ANISOCYTOSIS BLD QL: SLIGHT — SIGNIFICANT CHANGE UP
APTT BLD: 28.3 SEC — SIGNIFICANT CHANGE UP (ref 24.5–35.6)
AST SERPL-CCNC: 24 U/L — SIGNIFICANT CHANGE UP (ref 4–40)
BASOPHILS # BLD AUTO: 0.12 K/UL — SIGNIFICANT CHANGE UP (ref 0–0.2)
BASOPHILS NFR BLD AUTO: 1 % — SIGNIFICANT CHANGE UP (ref 0–2)
BILIRUB SERPL-MCNC: 0.6 MG/DL — SIGNIFICANT CHANGE UP (ref 0.2–1.2)
BLASTS # FLD: 0 % — SIGNIFICANT CHANGE UP (ref 0–0)
BLOOD GAS VENOUS COMPREHENSIVE RESULT: SIGNIFICANT CHANGE UP
BUN SERPL-MCNC: 13 MG/DL — SIGNIFICANT CHANGE UP (ref 7–23)
CALCIUM SERPL-MCNC: 9.9 MG/DL — SIGNIFICANT CHANGE UP (ref 8.4–10.5)
CHLORIDE SERPL-SCNC: 105 MMOL/L — SIGNIFICANT CHANGE UP (ref 98–107)
CHOLEST SERPL-MCNC: 235 MG/DL — HIGH
CO2 SERPL-SCNC: 24 MMOL/L — SIGNIFICANT CHANGE UP (ref 22–31)
CREAT SERPL-MCNC: 0.91 MG/DL — SIGNIFICANT CHANGE UP (ref 0.5–1.3)
EGFR: 101 ML/MIN/1.73M2 — SIGNIFICANT CHANGE UP
EOSINOPHIL # BLD AUTO: 0.12 K/UL — SIGNIFICANT CHANGE UP (ref 0–0.5)
EOSINOPHIL NFR BLD AUTO: 1 % — SIGNIFICANT CHANGE UP (ref 0–6)
GIANT PLATELETS BLD QL SMEAR: PRESENT — SIGNIFICANT CHANGE UP
GLUCOSE SERPL-MCNC: 122 MG/DL — HIGH (ref 70–99)
HCT VFR BLD CALC: 41.8 % — SIGNIFICANT CHANGE UP (ref 39–50)
HDLC SERPL-MCNC: 32 MG/DL — LOW
HGB BLD-MCNC: 14.2 G/DL — SIGNIFICANT CHANGE UP (ref 13–17)
IANC: 4.18 K/UL — SIGNIFICANT CHANGE UP (ref 1.8–7.4)
INR BLD: 0.94 RATIO — SIGNIFICANT CHANGE UP (ref 0.85–1.16)
LIPID PNL WITH DIRECT LDL SERPL: SIGNIFICANT CHANGE UP MG/DL
LYMPHOCYTES # BLD AUTO: 1.23 K/UL — SIGNIFICANT CHANGE UP (ref 1–3.3)
LYMPHOCYTES # BLD AUTO: 10.4 % — LOW (ref 13–44)
MANUAL SMEAR VERIFICATION: SIGNIFICANT CHANGE UP
MCHC RBC-ENTMCNC: 28.4 PG — SIGNIFICANT CHANGE UP (ref 27–34)
MCHC RBC-ENTMCNC: 34 GM/DL — SIGNIFICANT CHANGE UP (ref 32–36)
MCV RBC AUTO: 83.6 FL — SIGNIFICANT CHANGE UP (ref 80–100)
MICROCYTES BLD QL: SIGNIFICANT CHANGE UP
MONOCYTES # BLD AUTO: 0.5 K/UL — SIGNIFICANT CHANGE UP (ref 0–0.9)
MONOCYTES NFR BLD AUTO: 4.2 % — SIGNIFICANT CHANGE UP (ref 2–14)
MYELOCYTES NFR BLD: 4.2 % — HIGH (ref 0–0)
NEUTROPHILS # BLD AUTO: 5.66 K/UL — SIGNIFICANT CHANGE UP (ref 1.8–7.4)
NEUTROPHILS NFR BLD AUTO: 46.9 % — SIGNIFICANT CHANGE UP (ref 43–77)
NEUTS BAND # BLD: 3 % — SIGNIFICANT CHANGE UP (ref 0–6)
NON HDL CHOLESTEROL: 203 MG/DL — HIGH
OVALOCYTES BLD QL SMEAR: SLIGHT — SIGNIFICANT CHANGE UP
PLAT MORPH BLD: NORMAL — SIGNIFICANT CHANGE UP
PLATELET # BLD AUTO: 200 K/UL — SIGNIFICANT CHANGE UP (ref 150–400)
PLATELET COUNT - ESTIMATE: NORMAL — SIGNIFICANT CHANGE UP
POIKILOCYTOSIS BLD QL AUTO: SLIGHT — SIGNIFICANT CHANGE UP
POTASSIUM SERPL-MCNC: 4.2 MMOL/L — SIGNIFICANT CHANGE UP (ref 3.5–5.3)
POTASSIUM SERPL-SCNC: 4.2 MMOL/L — SIGNIFICANT CHANGE UP (ref 3.5–5.3)
PROT SERPL-MCNC: 6.7 G/DL — SIGNIFICANT CHANGE UP (ref 6–8.3)
PROTHROM AB SERPL-ACNC: 11.2 SEC — SIGNIFICANT CHANGE UP (ref 9.9–13.4)
RBC # BLD: 5 M/UL — SIGNIFICANT CHANGE UP (ref 4.2–5.8)
RBC # FLD: 13.2 % — SIGNIFICANT CHANGE UP (ref 10.3–14.5)
RBC BLD AUTO: NORMAL — SIGNIFICANT CHANGE UP
SMUDGE CELLS # BLD: PRESENT — SIGNIFICANT CHANGE UP
SODIUM SERPL-SCNC: 138 MMOL/L — SIGNIFICANT CHANGE UP (ref 135–145)
TRIGL SERPL-MCNC: 728 MG/DL — HIGH
TROPONIN T, HIGH SENSITIVITY RESULT: 7 NG/L — SIGNIFICANT CHANGE UP
VARIANT LYMPHS # BLD: 29.2 % — HIGH (ref 0–6)
WBC # BLD: 11.82 K/UL — HIGH (ref 3.8–10.5)
WBC # FLD AUTO: 11.82 K/UL — HIGH (ref 3.8–10.5)

## 2024-10-01 PROCEDURE — 99223 1ST HOSP IP/OBS HIGH 75: CPT

## 2024-10-01 PROCEDURE — 70498 CT ANGIOGRAPHY NECK: CPT | Mod: 26,MC

## 2024-10-01 PROCEDURE — 93010 ELECTROCARDIOGRAM REPORT: CPT

## 2024-10-01 PROCEDURE — 0042T: CPT | Mod: MC

## 2024-10-01 PROCEDURE — 70450 CT HEAD/BRAIN W/O DYE: CPT | Mod: 26,MC,59

## 2024-10-01 PROCEDURE — 70496 CT ANGIOGRAPHY HEAD: CPT | Mod: 26,MC

## 2024-10-01 RX ORDER — ASPIRIN 81 MG
81 TABLET, DELAYED RELEASE (ENTERIC COATED) ORAL DAILY
Refills: 0 | Status: ACTIVE | OUTPATIENT
Start: 2024-10-01 | End: 2025-08-30

## 2024-10-01 NOTE — ED PROVIDER NOTE - OBJECTIVE STATEMENT
53M, PMHx HTN, HLD, CAD s/p PCI presents to ED complaining of 3 weeks of consistent dizziness.  Patient states dizziness began acutely, not associated with movement, and associated with intermittent blurry vision.  Patient states he saw his primary care doctor, was prescribed meclizine, which did not palliate his dizziness.  Patient states that dizziness has continued to worsen, prompting him to come to ED for further evaluation.  Patient denies nausea, vomiting, head strike, headache, focal neurodeficits.  Denies chest pain, palpitations, shortness of breath, abdominal pain, urinary or bowel symptoms.

## 2024-10-01 NOTE — ED PROVIDER NOTE - PHYSICAL EXAMINATION
Gen: AAOx3, uncomfortable appearing  HEENT: NCAT. PEERLA, EOMI, oral mucosa moist, normal conjunctiva. Intact and nonerythematous TMs. Cone of light noted, no effusion.    Lung: CTAB, no respiratory distress, no wheezes/rhonchi/rales B/L, speaking in full sentences  CV: RRR, no murmurs, rubs or gallops  Abd: soft, NTND, no guarding, no CVA tenderness  MSK: no visible deformities  Neuro: Direction changing horizontal nystagmus with vertical nystagmus. No corrective saccade noted on head impulse. Normal test of skew. Sensation intact in all major dermatomes. Strength 5/5 in all extremities in all motions.   Skin: Warm, well perfused, no rash  Psych: normal affect.

## 2024-10-01 NOTE — CONSULT NOTE ADULT - SUBJECTIVE AND OBJECTIVE BOX
Neurology - Consult Note    -  Spectra: [phone] ([facility]), [phone] ([facility])  -    HPI: Patient MD BARAJAS is a 53y (1970) man with a PMHx significant for HTN, HLD, CAD s/p PCI presents to ED complaining of 3 weeks of consistent dizziness.  Patient states dizziness began acutely, not associated with movement, and associated with intermittent blurry vision.  Patient states he saw his primary care doctor, was prescribed meclizine, which did not palliate his dizziness.  Patient states that dizziness has continued to worsen, prompting him to come to ED for further evaluation.  Patient denies nausea, vomiting, head strike, headache, focal neurodeficits.      Review of Systems:     Allergies: No Known Allergies    PMHx/PSHx/Family Hx: As above, otherwise see below   Sciatic Nerve Disease  HTN (hypertension)  GERD (gastroesophageal reflux disease)  CAD (coronary artery disease)  Coronary artery disease involving native coronary artery of native heart without angina pectoris    Social Hx: No current use of tobacco, alcohol, or illicit drugs    Medications:  MEDICATIONS  (STANDING):  MEDICATIONS  (PRN):    Vitals:  T(C): 36.7 (10-01-24 @ 14:37), Max: 36.8 (10-01-24 @ 10:23)  HR: 70 (10-01-24 @ 14:37) (65 - 96)  BP: 113/73 (10-01-24 @ 14:37) (110/76 - 129/82)  RR: 18 (10-01-24 @ 14:37) (16 - 18)  SpO2: 97% (10-01-24 @ 14:37) (96% - 97%)    Physical Examination: INCOMPLETE  General - NAD    Neurologic Exam:  Mental status - Awake, Alert, Oriented to person, place, and time. Speech fluent, repetition and naming intact. Follows simple and complex commands. Attention/concentration, recent and remote memory (including registration and recall), and fund of knowledge intact    Cranial nerves - PERRLA, VFF, EOMI, face sensation (V1-V3) intact b/l, facial strength intact without asymmetry b/l, hearing intact b/l, palate with symmetric elevation, trapezius 5/5 strength b/l, tongue midline on protrusion with full lateral movement    Motor - Normal bulk and tone throughout. No pronator drift.  Strength testing            Deltoid      Biceps      Triceps     Wrist Extension    Wrist Flexion         R            5                 5               5                     5                              5                        5              L             5                 5               5                     5                              5                        5                            Hip Flexion       Knee Flexion    Knee Extension    Dorsiflexion    Plantar Flexion  R              5                           5                       5                           5                            5                            L              5                           5                        5                           5                            5                          Sensation - Light touch intact throughout    DTR's -             Biceps      Triceps     Brachioradialis      Patellar    Ankle    Toes/plantar response  R             2+             2+                  2+                       2+            2+                 Down  L              2+             2+                 2+                        2+           2+                 Down    Coordination - Finger to Nose intact b/l. No tremors appreciated    Gait and station - Normal casual gait. Romberg (-)    Labs:                        14.2   11.82 )-----------( 200      ( 01 Oct 2024 11:50 )             41.8     10-01    138  |  105  |  13  ----------------------------<  122[H]  4.2   |  24  |  0.91    Ca    9.9      01 Oct 2024 11:50    TPro  6.7  /  Alb  4.3  /  TBili  0.6  /  DBili  x   /  AST  24  /  ALT  35  /  AlkPhos  70  10-01    CAPILLARY BLOOD GLUCOSE    POCT Blood Glucose.: 103 mg/dL (01 Oct 2024 12:03)    LIVER FUNCTIONS - ( 01 Oct 2024 11:50 )  Alb: 4.3 g/dL / Pro: 6.7 g/dL / ALK PHOS: 70 U/L / ALT: 35 U/L / AST: 24 U/L / GGT: x           PT/INR - ( 01 Oct 2024 11:50 )   PT: 11.2 sec;   INR: 0.94 ratio       PTT - ( 01 Oct 2024 11:50 )  PTT:28.3 sec    Radiology:  NONCONTRAST CT HEAD:  There is periventricular and subcortical white matter hypodensity without mass effect, nonspecific, likely representing mild chronic microvascular ischemic changes. There is no compelling evidence for an acute transcortical infarction. There is no evidence of mass, mass effect, midline shift or extra-axial fluid collection. The lateral ventricles and cortical sulci are age-appropriate in size and configuration. The orbits, mastoid air cells and visualized paranasal sinuses are unremarkable. The calvarium is intact. Consider MRI as clinically warranted.    CTA BRAIN:  The [place] and vertebrobasilar system are unremarkable without evidence of stenosis, occlusion or saccular aneurysm dilation. No evidence for arterial venous malformation. The vertebral arteries are codominant.    CTA NECK:  A left-sided aortic arch is demonstrated. There is normal relationship to the great vessels. The common carotid arteries, internal carotid arteries and vertebral arteries shows no evidence of significant stenosis, occlusion or saccular aneurysm dilation. The vertebral arteries are codominant.    CT PERFUSION:  Patient has only had less than 2 hours of symptoms may influence the CT perfusion.  No core infarct or evidence of delayed mean transit time is identified.  CBF<30% volume: 0 ml  Tmax>6.0 s volume: 0 ml  Mismatch volume: 0 ml  Mismatch ratio: none    IMPRESSION:  No acute intracranial pathology.   Negative CTP.   No large vessel occlusion.    Neurology - Consult Note    -  Spectra: [phone] ([facility]), [phone] ([facility])  -    HPI: Patient MD BARAJAS is a 53y (1970) man with a PMHx significant for HTN, HLD, CAD s/p PCI presents to ED complaining of 3 weeks of dizziness. Since onset he thinks it is getting worse. Dizziness is exacerbated with changing position. Patient states he saw his primary care doctor, was prescribed meclizine, which did not palliate his dizziness.  Patient states that dizziness has continued to worsen, prompting him to come to ED for further evaluation.  Patient denies nausea, vomiting, head strike, headache, numbness, double vision, loss of vision, changes in speech. Denies prior similar episodes. Admits to feeling cold and overall weak.     Review of Systems: refer to HPI.     Allergies: No Known Allergies    PMHx/PSHx/Family Hx: As above, otherwise see below   Sciatic Nerve Disease  HTN (hypertension)  GERD (gastroesophageal reflux disease)  CAD (coronary artery disease)  Coronary artery disease involving native coronary artery of native heart without angina pectoris    Social Hx: No current use of tobacco, alcohol, or illicit drugs    Medications:  MEDICATIONS  (STANDING):  MEDICATIONS  (PRN):    Vitals:  T(C): 36.7 (10-01-24 @ 14:37), Max: 36.8 (10-01-24 @ 10:23)  HR: 70 (10-01-24 @ 14:37) (65 - 96)  BP: 113/73 (10-01-24 @ 14:37) (110/76 - 129/82)  RR: 18 (10-01-24 @ 14:37) (16 - 18)  SpO2: 97% (10-01-24 @ 14:37) (96% - 97%)    Physical Examination:   General - NAD    Neurologic Exam:  Mental status - Awake, Alert, Oriented to person, place, and time. Speech fluent, repetition and naming intact. Follows simple commands. Attention/concentration, recent and remote memory (including registration and recall), and fund of knowledge intact    Cranial nerves - PERRLA, VFF, EOMI, face sensation (V1-V3) intact b/l, facial strength intact without asymmetry b/l, hearing intact b/l, palate with symmetric elevation, trapezius 5/5 strength b/l, tongue midline on protrusion with full lateral movement  left beating nystagmus on left gaze that fatigues after 4 beats,     Motor - Normal bulk and tone throughout. No pronator drift.  Strength testing            Deltoid      Biceps      Triceps     Wrist Extension    Wrist Flexion         R            5                 5               5                     5                              5                        5              L             5                 5               5                     5                              5                        5                            Hip Flexion       Knee Flexion    Knee Extension    Dorsiflexion    Plantar Flexion  R              5                           5                       5                           5                            5                            L              5                           5                        5                           5                            5                          Sensation - Light touch intact throughout    DTR's -             Biceps      Triceps     Brachioradialis      Patellar    Ankle    Toes/plantar response  R             2+             2+                  2+                       2+            2+                 Down  L              2+             2+                 2+                        2+           2+                 Down    Coordination - Finger to Nose intact b/l. No tremors appreciated    Gait and station - slow cautious gait. Romberg (-).     Labs:                        14.2   11.82 )-----------( 200      ( 01 Oct 2024 11:50 )             41.8     10-01    138  |  105  |  13  ----------------------------<  122[H]  4.2   |  24  |  0.91    Ca    9.9      01 Oct 2024 11:50    TPro  6.7  /  Alb  4.3  /  TBili  0.6  /  DBili  x   /  AST  24  /  ALT  35  /  AlkPhos  70  10-01    CAPILLARY BLOOD GLUCOSE    POCT Blood Glucose.: 103 mg/dL (01 Oct 2024 12:03)    LIVER FUNCTIONS - ( 01 Oct 2024 11:50 )  Alb: 4.3 g/dL / Pro: 6.7 g/dL / ALK PHOS: 70 U/L / ALT: 35 U/L / AST: 24 U/L / GGT: x           PT/INR - ( 01 Oct 2024 11:50 )   PT: 11.2 sec;   INR: 0.94 ratio       PTT - ( 01 Oct 2024 11:50 )  PTT:28.3 sec    Radiology:  NONCONTRAST CT HEAD:  There is periventricular and subcortical white matter hypodensity without mass effect, nonspecific, likely representing mild chronic microvascular ischemic changes. There is no compelling evidence for an acute transcortical infarction. There is no evidence of mass, mass effect, midline shift or extra-axial fluid collection. The lateral ventricles and cortical sulci are age-appropriate in size and configuration. The orbits, mastoid air cells and visualized paranasal sinuses are unremarkable. The calvarium is intact. Consider MRI as clinically warranted.    CTA BRAIN:  The [place] of [place] and vertebrobasilar system are unremarkable without evidence of stenosis, occlusion or saccular aneurysm dilation. No evidence for arterial venous malformation. The vertebral arteries are codominant.    CTA NECK:  A left-sided aortic arch is demonstrated. There is normal relationship to the great vessels. The common carotid arteries, internal carotid arteries and vertebral arteries shows no evidence of significant stenosis, occlusion or saccular aneurysm dilation. The vertebral arteries are codominant.    CT PERFUSION:  Patient has only had less than 2 hours of symptoms may influence the CT perfusion.  No core infarct or evidence of delayed mean transit time is identified.  CBF<30% volume: 0 ml  Tmax>6.0 s volume: 0 ml  Mismatch volume: 0 ml  Mismatch ratio: none    IMPRESSION:  No acute intracranial pathology.   Negative CTP.   No large vessel occlusion.

## 2024-10-01 NOTE — ED PROVIDER NOTE - PROGRESS NOTE DETAILS
DO Deepali:  Pt reassessed, reports continued dizziness.  Labs unremarkable. CT studies w/o evidence of CVA.  Neurology consulted, advised they will see pt.

## 2024-10-01 NOTE — ED ADULT TRIAGE NOTE - CHIEF COMPLAINT QUOTE
pt coming with 2 wks of lightheaded with hypotension done at home by pt., seen by MD given Meclizine 1 wk ago, pt denies CP, no SOB, + apoorva. ear pressure at times.  no upper and lower apoorva. extrem. weakness

## 2024-10-01 NOTE — CONSULT NOTE ADULT - ATTENDING COMMENTS
53M with HTN, HLD, CAD s/p PCI presents to ED complaining of 3 weeks of dizziness, worsened with change in position.  CT/A/P all reviewed, negative  MRI brain with IAC protocol is negative as well  Had viral infection 3 weeks ago  suspect peripheral vertigo  meclizine 25mg TID PRN  Can also try valium as needed for refractory symptoms  would benefit from outpatient VNG and vestibular therapy   No objection to continuing aspirin as needed for CAD    Tanja Toribio DO  Vascular Neurology  Office [phone]

## 2024-10-01 NOTE — ED CDU PROVIDER INITIAL DAY NOTE - IN ACCORDANCE WITH [place] LAW, WE OFFER EVERY PATIENT A HEPATITIS C TEST. WOULD YOU LIKE TO BE TESTED TODAY?
Opt out
22 yr old Black male with hx of bipolar disorder was readmitted to  following continuation of manic symptoms.  Pt with long hx od noncompliance with medication   Last d/c was from  on  when the pt was on Risperdal .

## 2024-10-01 NOTE — ED CDU PROVIDER INITIAL DAY NOTE - CLINICAL SUMMARY MEDICAL DECISION MAKING FREE TEXT BOX
53-year-old male with a history of hypertension, hyperlipidemia, CAD with stents presented to the emergency department complaining of 3 weeks of room spinning dizziness.  Patient placed in CDU for MRI with neurology following. 53-year-old male with a history of hypertension, hyperlipidemia, CAD with stents presented to the emergency department complaining of 3 weeks of room spinning dizziness.  Patient placed in CDU for MRI with neurology following.    Attending MD Serrano.  Agree with above.  Pt is a 52 yo male with pmhx of HTN, HLD, CAD s/p stenting with 3 wks non-extinguishable dizziness.  PCP gave meclizine which hasn't happened.  Not provoked by movement.  Endorsing now constant dizziness assoc with intermittent blurry vision.  No double vision.  No trauma, loss of consciousness/head strike.  HINTS +.  No corrective saccad, bilateral horizontal and vertigal nystagmus.  Intact strength and sensation.  Concern for delayed presentation of posterior stroke.  Planned labs, neuro consult.  Neuro consulted and recommending CDU for MRI in setting of no acutely actionable CT imaging findings but persistent dizziness.  Stable for transfer of care to CDU/obs unit.

## 2024-10-01 NOTE — ED PROVIDER NOTE - CLINICAL SUMMARY MEDICAL DECISION MAKING FREE TEXT BOX
Attending MD Serrano.  Agree with above.  Pt is a 52 yo male with pmhx of HTN, HLD, CAD s/p stenting with 3 wks non-extinguishable dizziness.  PCP gave meclizine which hasn't happened.  Not provoked by movement.  Endorsing now constant dizziness assoc with intermittent blurry vision.  No double vision.  No trauma, loss of consciousness/head strike.  HINTS +.  No corrective saccad, bilateral horizontal and vertigal nystagmus.  Intact strength and sensation.  Concern for delayed presentation of posterior stroke.  Planned labs, neuro consult. Deepali DO:  53M, PMHx HTN, HLD, CAD s/p PCI presents to ED complaining of 3 weeks of consistent dizziness.  Patient states dizziness began acutely, not associated with movement, and associated with intermittent blurry vision.  Patient states he saw his primary care doctor, was prescribed meclizine, which did not palliate his dizziness.  Patient states that dizziness has continued to worsen, prompting him to come to ED for further evaluation.  Patient denies nausea, vomiting, head strike, headache, focal neurodeficits.  Denies chest pain, palpitations, shortness of breath, abdominal pain, urinary or bowel symptoms.    Hemodynamically stable, afebrile.  Hints exam positive, dizziness not provoked by motion, associated visual changes highly concerning for delayed presentation of posterior stroke.  Ear exam with no concern for middle ear effusion.  Peripheral etiology less likely given positive hints exam.  Will acquire labs, CT head, CT angio head and neck, CT perfusion brain.  Neuro consult.  Dispo likely CDU versus admission for further workup.    Attending MD Serrano.  Agree with above.  Pt is a 52 yo male with pmhx of HTN, HLD, CAD s/p stenting with 3 wks non-extinguishable dizziness.  PCP gave meclizine which hasn't happened.  Not provoked by movement.  Endorsing now constant dizziness assoc with intermittent blurry vision.  No double vision.  No trauma, loss of consciousness/head strike.  HINTS +.  No corrective saccad, bilateral horizontal and vertigal nystagmus.  Intact strength and sensation.  Concern for delayed presentation of posterior stroke.  Planned labs, neuro consult. Deepali DO:  53M, PMHx HTN, HLD, CAD s/p PCI presents to ED complaining of 3 weeks of consistent dizziness.  Patient states dizziness began acutely, not associated with movement, and associated with intermittent blurry vision.  Patient states he saw his primary care doctor, was prescribed meclizine, which did not palliate his dizziness.  Patient states that dizziness has continued to worsen, prompting him to come to ED for further evaluation.  Patient denies nausea, vomiting, head strike, headache, focal neurodeficits.  Denies chest pain, palpitations, shortness of breath, abdominal pain, urinary or bowel symptoms.    Hemodynamically stable, afebrile.  Hints exam positive, dizziness not provoked by motion, associated visual changes highly concerning for delayed presentation of posterior stroke.  Ear exam with no concern for middle ear effusion.  Peripheral etiology less likely given positive hints exam.  Will acquire labs, CT head, CT angio head and neck, CT perfusion brain.  Neuro consult.  Dispo likely CDU versus admission for further workup.    Attending MD Serrano.  Agree with above.  Pt is a 54 yo male with pmhx of HTN, HLD, CAD s/p stenting with 3 wks non-extinguishable dizziness.  PCP gave meclizine which hasn't happened.  Not provoked by movement.  Endorsing now constant dizziness assoc with intermittent blurry vision.  No double vision.  No trauma, loss of consciousness/head strike.  HINTS +.  No corrective saccad, bilateral horizontal and vertigal nystagmus.  Intact strength and sensation.  Concern for delayed presentation of posterior stroke.  Planned labs, neuro consult.  Neuro consulted and recommending CDU for MRI in setting of no acutely actionable CT imaging findings but persistent dizziness.  Stable for transfer of care to CDU/obs unit.

## 2024-10-01 NOTE — CONSULT NOTE ADULT - ASSESSMENT
53y (1970) man with a PMHx significant for HTN, HLD, CAD s/p PCI presents to ED complaining of 3 weeks of dizziness. Since onset he thinks it is getting worse. Dizziness is exacerbated with changing position. Patient states he saw his primary care doctor, was prescribed meclizine, which did not palliate his dizziness.  Patient states that dizziness has continued to worsen, prompting him to come to ED for further evaluation.  Patient denies nausea, vomiting, head strike, headache, numbness, double vision, loss of vision, changes in speech. Denies prior similar episodes. tinnitus Neuro exam notable for left beating horizontal nystagmus that fatigues after 4 beats. HINTS indeterminant due to head impulse test elicits dizziness sensation and patient closes eyes, no vertical skew. CTH, CTA H/N, CTP ordered by ED was unremarkable. Patient admits to taking daily aspirin 81mg.    Impression: sudden onset and persistent room spinning sensation for 3 weeks concerning for acute vestibular syndrome likely due to peripheral vertigo causes such as BPPV vs vestibular neuritis vs less likel posterior circulation stroke    Plan  CDU admission  MRI brain   MRI IAC with contrast  continue daily home aspirin  Lipid panel, A1c  EKG  normotension    Case to be seen and discussed with Neurology Attending  53y (1970) man with a PMHx significant for HTN, HLD, CAD s/p PCI presents to ED complaining of 3 weeks of dizziness. Since onset he thinks it is getting worse. Dizziness is exacerbated with changing position. Patient states he saw his primary care doctor, was prescribed meclizine, which did not palliate his dizziness.  Patient states that dizziness has continued to worsen, prompting him to come to ED for further evaluation.  Patient denies nausea, vomiting, head strike, headache, numbness, double vision, loss of vision, changes in speech. Denies prior similar episodes. tinnitus Neuro exam notable for left beating horizontal nystagmus that fatigues after 4 beats. HINTS indeterminant due to head impulse test elicits dizziness sensation and patient closes eyes, no vertical skew. CTH, CTA H/N, CTP ordered by ED was unremarkable. Patient admits to taking daily aspirin 81mg. NIHSS 0.    Impression: sudden onset and persistent room spinning sensation for 3 weeks concerning for acute vestibular syndrome likely due to peripheral vertigo causes such as BPPV vs vestibular neuritis vs less likely posterior circulation stroke    Plan  CDU admission  MRI brain   MRI IAC with contrast  continue daily home aspirin  Lipid panel, A1c  EKG  normotension    Case to be seen and discussed with Neurology Attending

## 2024-10-01 NOTE — ED PROVIDER NOTE - ATTENDING CONTRIBUTION TO CARE
Attending MD Serrano:  I performed a history and physical exam of the patient and discussed their management with the resident. I reviewed the resident's note and agree with the documented findings and plan of care. My medical decision making and observations are found above.

## 2024-10-01 NOTE — ED CDU PROVIDER INITIAL DAY NOTE - ATTENDING APP SHARED VISIT CONTRIBUTION OF CARE
Attending MD Serrano.  Agree with above.  Pt is a 54 yo male with pmhx of HTN, HLD, CAD s/p stenting with 3 wks non-extinguishable dizziness.  PCP gave meclizine which hasn't happened.  Not provoked by movement.  Endorsing now constant dizziness assoc with intermittent blurry vision.  No double vision.  No trauma, loss of consciousness/head strike.  HINTS +.  No corrective saccad, bilateral horizontal and vertigal nystagmus.  Intact strength and sensation.  Concern for delayed presentation of posterior stroke.  Planned labs, neuro consult.  Neuro consulted and recommending CDU for MRI in setting of no acutely actionable CT imaging findings but persistent dizziness.  Stable for transfer of care to CDU/obs unit.

## 2024-10-01 NOTE — ED CDU PROVIDER INITIAL DAY NOTE - OBJECTIVE STATEMENT
53-year-old male with a history of hypertension, hyperlipidemia, CAD with stents presented to the emergency department complaining of 3 weeks of room spinning dizziness.  Patient states symptoms are worse with movement.  Patient denies chest pain, shortness of breath, fevers, chills, recent illness, headache, abdominal pain, nausea, vomiting.  H&P obtained via  ID number [id number].

## 2024-10-01 NOTE — ED CDU PROVIDER INITIAL DAY NOTE - DETAILS
53-year-old male with a history of hypertension, hyperlipidemia, CAD with stents presented to the emergency department complaining of 3 weeks of room spinning dizziness.  Patient placed in CDU for MRI with neurology following.

## 2024-10-01 NOTE — ED ADULT NURSE NOTE - OBJECTIVE STATEMENT
This is a 53 y/0 male alert and oriented x 4, with past medical history of HTN, heart problems with stents. Complaining of dizziness x 2 wks. Felt like his surroundings is spinning around him, denies tinnitus, unsteady gait due to dizziness. Not in any distress, no sob noted. Breathing is even and unlabored, Denies visual changes. IV initiated on left arm g 20 blood work sent to lab. Waiting for results.

## 2024-10-02 VITALS
SYSTOLIC BLOOD PRESSURE: 127 MMHG | DIASTOLIC BLOOD PRESSURE: 90 MMHG | OXYGEN SATURATION: 96 % | RESPIRATION RATE: 16 BRPM | HEART RATE: 84 BPM | TEMPERATURE: 98 F

## 2024-10-02 PROCEDURE — 70552 MRI BRAIN STEM W/DYE: CPT | Mod: 26,MC

## 2024-10-02 PROCEDURE — 70551 MRI BRAIN STEM W/O DYE: CPT | Mod: 26,MC,59

## 2024-10-02 PROCEDURE — 99239 HOSP IP/OBS DSCHRG MGMT >30: CPT

## 2024-10-02 RX ORDER — DIAZEPAM 10 MG
5 TABLET ORAL EVERY 8 HOURS
Refills: 0 | Status: DISCONTINUED | OUTPATIENT
Start: 2024-10-02 | End: 2024-10-02

## 2024-10-02 RX ORDER — MECLIZINE HYDROCHLORIDE 25 MG/1
1 TABLET ORAL
Qty: 15 | Refills: 0
Start: 2024-10-02 | End: 2024-10-06

## 2024-10-02 RX ORDER — DIAZEPAM 10 MG
1 TABLET ORAL
Qty: 12 | Refills: 0
Start: 2024-10-02 | End: 2024-10-05

## 2024-10-02 RX ORDER — MECLIZINE HYDROCHLORIDE 25 MG/1
25 TABLET ORAL THREE TIMES A DAY
Refills: 0 | Status: ACTIVE | OUTPATIENT
Start: 2024-10-02 | End: 2025-08-31

## 2024-10-02 RX ORDER — MECLIZINE HYDROCHLORIDE 25 MG/1
25 TABLET ORAL ONCE
Refills: 0 | Status: COMPLETED | OUTPATIENT
Start: 2024-10-02 | End: 2024-10-02

## 2024-10-02 RX ORDER — METOCLOPRAMIDE HCL 5 MG
10 TABLET ORAL EVERY 6 HOURS
Refills: 0 | Status: ACTIVE | OUTPATIENT
Start: 2024-10-02 | End: 2025-08-31

## 2024-10-02 RX ORDER — METOCLOPRAMIDE HCL 5 MG
10 TABLET ORAL ONCE
Refills: 0 | Status: COMPLETED | OUTPATIENT
Start: 2024-10-02 | End: 2024-10-02

## 2024-10-02 RX ADMIN — Medication 10 MILLIGRAM(S): at 00:17

## 2024-10-02 RX ADMIN — MECLIZINE HYDROCHLORIDE 25 MILLIGRAM(S): 25 TABLET ORAL at 00:17

## 2024-10-02 RX ADMIN — Medication 81 MILLIGRAM(S): at 11:55

## 2024-10-02 NOTE — ED CDU PROVIDER SUBSEQUENT DAY NOTE - ATTENDING APP SHARED VISIT CONTRIBUTION OF CARE
53M HTN HLD CAD w/stents p/w room spinning dizziness x 3 days.  CTA neg.  Neuro felt pt should get MRI, which was negative.  Pt got reglan and meclizine still felt dizzy, rx valium.  Normal neuro exam including gait but pt reports persistent dizziness.  Symptom control and follow up for vertigo.  Neuro to see.    VS:  unremarkable    GEN - NAD;   malaise;   A+O x3   HEAD - NC/AT     ENT - PEERL, EOMI, mucous membranes    moist , no discharge    No nystagmus.  NECK: Neck supple, non-tender without lymphadenopathy, no masses, no JVD  PULM - CTA b/l,  symmetric breath sounds  COR -  normal heart sounds    ABD - , ND, NT, soft,  BACK - no CVA tenderness, nontender spine     EXTREMS - no edema, no deformity, warm and well perfused    SKIN - no rash    or bruising      NEUROLOGIC - alert, face symmetric, speech fluent, sensation nl, motor no focal deficit.

## 2024-10-02 NOTE — ED CDU PROVIDER DISPOSITION NOTE - PATIENT PORTAL LINK FT
You can access the FollowMyHealth Patient Portal offered by [facility] by registering at the following website: [email or web address]. By joining [employer]’s FollowMyHealth portal, you will also be able to view your health information using other applications (apps) compatible with our system.

## 2024-10-02 NOTE — ED CDU PROVIDER DISPOSITION NOTE - NSFOLLOWUPINSTRUCTIONS_ED_ALL_ED_FT
Advance activity as tolerated.  Continue all previously prescribed medications as directed unless otherwise instructed.  Take Meclizine 25 mg three times a day as needed for dizziness -- may cause drowsiness; DO NOT DRINK ALCOHOL, DRIVE, OR OPERATE HEAVY MACHINERY while taking this medication.  Follow up with your primary care physician and neurology (referral list provided or you may follow up in the clinic, please call [phone])  in 48-72 hours- bring copies of your results.  Return to the Emergency Department for worsening or persistent symptoms, including but not limited to worsening/persistent headache, numbness, weakness, changes in vision or hearing, dizziness, difficulty walking, falls, slurred speech, neck stiffness, fevers, lightheadedness and/or ANY NEW OR CONCERNING SYMPTOMS. If you have issues obtaining follow up, please call: [phone] to obtain a doctor or specialist who takes your insurance in your area.  You may call [phone] to make an appointment with the internal medicine clinic.    DIZZINESS - General Information    Dizziness    WHAT YOU NEED TO KNOW:    What is dizziness? Dizziness is a feeling of being off balance or unsteady. Common causes of dizziness are an inner ear fluid imbalance or a lack of oxygen in your blood. Dizziness may be acute (lasts 3 days or less) or chronic (lasts longer than 3 days). You may have dizzy spells that last from seconds to a few hours.    What increases my risk for dizziness? Dizziness may get worse during certain activities or when you move a certain way. The following may also increase your risk for dizziness:    Older age    An infection, ear surgery, or an inner ear condition, such as Ménière disease    Stroke, a brain tumor, or a recent head trauma    An injury that causes a large amount of blood loss    Heart or blood pressure problems    Exposure to chemicals, or long-term alcohol use    Medicines used to treat high blood pressure, seizure disorders, or anxiety and depression    A nerve disorder, such as multiple sclerosis  What signs and symptoms may happen with dizziness?    A feeling that your surroundings are moving even though you are standing still    Ringing in your ears or hearing loss    Feeling faint or lightheaded    Weakness or unsteadiness    Double vision or eye movements you cannot control    Nausea or vomiting    Confusion  How is the cause of dizziness diagnosed? Your healthcare provider may ask when the dizziness started. Tell the provider if you have dizzy spells, and how long they last. Tell the provider what happens before you become dizzy. The provider will ask if you have other health conditions and if you take any medicines. The provider will check your blood pressure and pulse to see if your dizziness may be related to your heart. Your balance, strength, reflexes, and the way you walk may also be checked. You may need any of the following tests to help find the cause of your dizziness:    An EKG records the electrical activity of your heart. An EKG can be used to check for an abnormal heart beat or heart damage.    Blood tests will check your blood sugar level, infection, and your blood cell levels.    CT or MRI pictures check for a stroke, head injury, or brain tumor. They also check for brain bleeding or swelling. You may be given contrast liquid to help your brain show up better in the pictures. Tell the healthcare provider if you have ever had an allergic reaction to contrast liquid. Do not enter the MRI room with anything metal. Metal can cause serious injury. Tell the healthcare provider if you have any metal in or on your body.  How is dizziness treated? Treatment will depend on the cause of your dizziness. Your healthcare provider may give you oxygen or medicines to decrease your dizziness and nausea. Your provider may also refer you to a specialist. You may need to be admitted to the hospital for treatment.    How can I manage my symptoms?    Do not drive or operate heavy machinery when you are dizzy.    Get up slowly from sitting or lying down.    Drink plenty of liquids. Liquids help prevent dehydration. Ask how much liquid to drink each day and which liquids are best for you.  When should I seek immediate care?    You have a headache and a stiff neck.    You have shaking chills and a fever.    You vomit over and over with no relief.    Your vomit or bowel movements are red or black.    You have pain in your chest, back, or abdomen.    You have numbness, especially in your face, arms, or legs.    You have trouble moving your arms or legs.    You are confused.  When should I contact my healthcare provider?    You have a fever.    Your symptoms do not get better with treatment.    You have questions or concerns about your condition or care.  CARE AGREEMENT:    You have the right to help plan your care. Learn about your health condition and how it may be treated. Discuss treatment options with your healthcare providers to decide what care you want to receive. You always have the right to refuse treatment.    © [employer] 1973, 2023 Advance activity as tolerated.  Continue all previously prescribed medications as directed unless otherwise instructed.  Take Valium 5 mg every 8 hours as needed for dizziness-- causes drowsiness; DO NOT drink alcohol, drive, or operate heavy machinery with this medication.  Take Meclizine 25 mg three times a day as needed for dizziness -- may cause drowsiness; DO NOT DRINK ALCOHOL, DRIVE, OR OPERATE HEAVY MACHINERY while taking this medication.  Follow up with your primary care physician, physical therapy (for vestibular therapy) and neurology (referral list provided or you may follow up in the clinic, please call [phone])  in 48-72 hours- bring copies of your results.  Return to the Emergency Department for worsening or persistent symptoms, including but not limited to worsening/persistent headache, numbness, weakness, changes in vision or hearing, dizziness, difficulty walking, falls, slurred speech, neck stiffness, fevers, lightheadedness and/or ANY NEW OR CONCERNING SYMPTOMS. If you have issues obtaining follow up, please call: [phone] to obtain a doctor or specialist who takes your insurance in your area.  You may call [phone] to make an appointment with the internal medicine clinic.    DIZZINESS - General Information    Dizziness    WHAT YOU NEED TO KNOW:    What is dizziness? Dizziness is a feeling of being off balance or unsteady. Common causes of dizziness are an inner ear fluid imbalance or a lack of oxygen in your blood. Dizziness may be acute (lasts 3 days or less) or chronic (lasts longer than 3 days). You may have dizzy spells that last from seconds to a few hours.    What increases my risk for dizziness? Dizziness may get worse during certain activities or when you move a certain way. The following may also increase your risk for dizziness:    Older age    An infection, ear surgery, or an inner ear condition, such as Ménière disease    Stroke, a brain tumor, or a recent head trauma    An injury that causes a large amount of blood loss    Heart or blood pressure problems    Exposure to chemicals, or long-term alcohol use    Medicines used to treat high blood pressure, seizure disorders, or anxiety and depression    A nerve disorder, such as multiple sclerosis  What signs and symptoms may happen with dizziness?    A feeling that your surroundings are moving even though you are standing still    Ringing in your ears or hearing loss    Feeling faint or lightheaded    Weakness or unsteadiness    Double vision or eye movements you cannot control    Nausea or vomiting    Confusion  How is the cause of dizziness diagnosed? Your healthcare provider may ask when the dizziness started. Tell the provider if you have dizzy spells, and how long they last. Tell the provider what happens before you become dizzy. The provider will ask if you have other health conditions and if you take any medicines. The provider will check your blood pressure and pulse to see if your dizziness may be related to your heart. Your balance, strength, reflexes, and the way you walk may also be checked. You may need any of the following tests to help find the cause of your dizziness:    An EKG records the electrical activity of your heart. An EKG can be used to check for an abnormal heart beat or heart damage.    Blood tests will check your blood sugar level, infection, and your blood cell levels.    CT or MRI pictures check for a stroke, head injury, or brain tumor. They also check for brain bleeding or swelling. You may be given contrast liquid to help your brain show up better in the pictures. Tell the healthcare provider if you have ever had an allergic reaction to contrast liquid. Do not enter the MRI room with anything metal. Metal can cause serious injury. Tell the healthcare provider if you have any metal in or on your body.  How is dizziness treated? Treatment will depend on the cause of your dizziness. Your healthcare provider may give you oxygen or medicines to decrease your dizziness and nausea. Your provider may also refer you to a specialist. You may need to be admitted to the hospital for treatment.    How can I manage my symptoms?    Do not drive or operate heavy machinery when you are dizzy.    Get up slowly from sitting or lying down.    Drink plenty of liquids. Liquids help prevent dehydration. Ask how much liquid to drink each day and which liquids are best for you.  When should I seek immediate care?    You have a headache and a stiff neck.    You have shaking chills and a fever.    You vomit over and over with no relief.    Your vomit or bowel movements are red or black.    You have pain in your chest, back, or abdomen.    You have numbness, especially in your face, arms, or legs.    You have trouble moving your arms or legs.    You are confused.  When should I contact my healthcare provider?    You have a fever.    Your symptoms do not get better with treatment.    You have questions or concerns about your condition or care.  CARE AGREEMENT:    You have the right to help plan your care. Learn about your health condition and how it may be treated. Discuss treatment options with your healthcare providers to decide what care you want to receive. You always have the right to refuse treatment.    © [employer] 1973, 2023

## 2024-10-02 NOTE — ED ADULT NURSE REASSESSMENT NOTE - NS ED NURSE REASSESS COMMENT FT1
Break coverage RN: pt A&Ox4 resting on bed in CDU 7. Respirations even and unlabored, normal sinus on cardiac monitor, sating 100% on room air. IV site patent, no redness or swelling noted. pt offers no complaints at this time. bed in lowest position, call bell in hand, safety maintained.
Vital sign stable no verbal complaints at this time.
Break RN: pt remains a&ox4, endorsing mild dizziness as per chief complaint. Pt denies chest pain, sob, headache, vision changes, numbness, tingling. Breathing even, unlabored. Safety maintained.

## 2024-10-02 NOTE — ED CDU PROVIDER SUBSEQUENT DAY NOTE - CLINICAL SUMMARY MEDICAL DECISION MAKING FREE TEXT BOX
53-year-old male with a history of hypertension, hyperlipidemia, CAD with stents presented to the emergency department complaining of 3 weeks of room spinning dizziness.  Patient placed in CDU for MRI with neurology following. MRI negative for acute pathology. Pending neuro reassessment in AM.

## 2024-10-02 NOTE — ED CDU PROVIDER DISPOSITION NOTE - CLINICAL COURSE
Patient is a 53-year-old male with past medical history of hypertension, hyperlipidemia, CAD with stent presents with dizziness x 3 weeks.  In the emergency department, patient had CT angio head and neck with following impression: "No acute intracranial pathology. Negative CTP. No large vessel occlusion.  Consider MRI of the brain for further evaluation."  Patient was evaluated by neurology.  Neurology recommending CDU admission for MRI.  MRI head and IAC with IV contrast with following impression: "No acute intracranial abnormality. No acute infarct.  Unremarkable MRI of the internal auditory canals." Patient is a 53-year-old male with past medical history of hypertension, hyperlipidemia, CAD with stent presents with dizziness x 3 weeks.  In the emergency department, patient had CT angio head and neck with following impression: "No acute intracranial pathology. Negative CTP. No large vessel occlusion.  Consider MRI of the brain for further evaluation."  Patient was evaluated by neurology.  Neurology recommending CDU admission for MRI.  MRI head and IAC with IV contrast with following impression: "No acute intracranial abnormality. No acute infarct.  Unremarkable MRI of the internal auditory canals."  Dr. Toribio recommends outpatient follow up.  Patient ambulatory at the time of disposition.  Patient was deemed medically stable for discharge with instructions to follow up with primary care provider, neurology and physical therapy.

## 2024-10-02 NOTE — ED CDU PROVIDER DISPOSITION NOTE - CARE PROVIDER_API CALL
Tanja Toribio  Neurology  [address], [address]  [place] [address]  Phone: [phone]  Fax: [phone]  Follow Up Time:

## 2024-10-02 NOTE — ED CDU PROVIDER SUBSEQUENT DAY NOTE - HISTORY
53-year-old male with a history of hypertension, hyperlipidemia, CAD with stents presented to the emergency department complaining of 3 weeks of room spinning dizziness.  Patient placed in CDU for MRI with neurology following.    Interval hx- Pt was not given any medication for dizziness, pt given Reglan and Meclizine without relief with try benzos in AM.
